# Patient Record
Sex: FEMALE | Race: WHITE | NOT HISPANIC OR LATINO | Employment: OTHER | URBAN - METROPOLITAN AREA
[De-identification: names, ages, dates, MRNs, and addresses within clinical notes are randomized per-mention and may not be internally consistent; named-entity substitution may affect disease eponyms.]

---

## 2022-09-14 ENCOUNTER — OFFICE VISIT (OUTPATIENT)
Dept: INTERNAL MEDICINE | Facility: CLINIC | Age: 72
End: 2022-09-14

## 2022-09-14 VITALS
TEMPERATURE: 98 F | BODY MASS INDEX: 38.98 KG/M2 | WEIGHT: 220 LBS | SYSTOLIC BLOOD PRESSURE: 128 MMHG | DIASTOLIC BLOOD PRESSURE: 80 MMHG | HEIGHT: 63 IN | OXYGEN SATURATION: 97 % | HEART RATE: 80 BPM

## 2022-09-14 DIAGNOSIS — F41.9 ANXIETY: ICD-10-CM

## 2022-09-14 DIAGNOSIS — E78.00 HYPERCHOLESTEROLEMIA: ICD-10-CM

## 2022-09-14 DIAGNOSIS — Z76.89 ENCOUNTER TO ESTABLISH CARE: Primary | ICD-10-CM

## 2022-09-14 DIAGNOSIS — I10 ESSENTIAL HYPERTENSION: ICD-10-CM

## 2022-09-14 DIAGNOSIS — E89.0 POSTOPERATIVE HYPOTHYROIDISM: ICD-10-CM

## 2022-09-14 DIAGNOSIS — F41.8 ANXIETY ABOUT HEALTH: ICD-10-CM

## 2022-09-14 DIAGNOSIS — E55.9 VITAMIN D DEFICIENCY: Chronic | ICD-10-CM

## 2022-09-14 DIAGNOSIS — I50.32 CHRONIC DIASTOLIC HEART FAILURE: ICD-10-CM

## 2022-09-14 DIAGNOSIS — F33.0 MILD EPISODE OF RECURRENT MAJOR DEPRESSIVE DISORDER: ICD-10-CM

## 2022-09-14 DIAGNOSIS — R10.84 GENERALIZED ABDOMINAL PAIN: ICD-10-CM

## 2022-09-14 PROBLEM — F33.9 RECURRENT MAJOR DEPRESSION: Chronic | Status: ACTIVE | Noted: 2019-05-13

## 2022-09-14 PROBLEM — M16.11 OSTEOARTHRITIS OF RIGHT HIP: Status: ACTIVE | Noted: 2022-05-06

## 2022-09-14 PROBLEM — G60.9 IDIOPATHIC PERIPHERAL NEUROPATHY: Status: ACTIVE | Noted: 2021-01-28

## 2022-09-14 PROBLEM — R27.8 SENSORY ATAXIA: Status: ACTIVE | Noted: 2021-01-28

## 2022-09-14 PROBLEM — M25.551 PAIN OF RIGHT HIP JOINT: Status: ACTIVE | Noted: 2022-05-06

## 2022-09-14 PROBLEM — F33.9 RECURRENT MAJOR DEPRESSION: Status: ACTIVE | Noted: 2019-05-13

## 2022-09-14 PROBLEM — K57.32 DIVERTICULITIS OF COLON: Status: ACTIVE | Noted: 2018-06-18

## 2022-09-14 PROBLEM — R29.6 RECURRENT FALLS: Status: ACTIVE | Noted: 2021-01-28

## 2022-09-14 PROBLEM — L82.1 SEBORRHEIC KERATOSIS: Status: ACTIVE | Noted: 2019-08-27

## 2022-09-14 PROCEDURE — 99204 OFFICE O/P NEW MOD 45 MIN: CPT | Performed by: NURSE PRACTITIONER

## 2022-09-14 RX ORDER — LOSARTAN POTASSIUM 25 MG/1
25 TABLET ORAL DAILY
COMMUNITY

## 2022-09-14 RX ORDER — DOCUSATE SODIUM 100 MG/1
100 CAPSULE, LIQUID FILLED ORAL 2 TIMES DAILY
COMMUNITY

## 2022-09-14 RX ORDER — ROSUVASTATIN CALCIUM 5 MG/1
TABLET, COATED ORAL
COMMUNITY

## 2022-09-14 RX ORDER — LEVOTHYROXINE SODIUM 137 UG/1
112 TABLET ORAL
COMMUNITY
Start: 2022-04-19 | End: 2022-09-22

## 2022-09-14 RX ORDER — ESCITALOPRAM OXALATE 5 MG/1
TABLET ORAL
COMMUNITY

## 2022-09-14 NOTE — PROGRESS NOTES
"        Chief Complaint  Abdominal Pain (Establish care new patient 10 weeks )     Subjective:      History of Present Illness {CC  Problem List  Visit  Diagnosis   Encounters  Notes  Medications  Labs  Result Review Imaging  Media :23}     Uzma Cancino presents to [facility] for:      She is here to establish care and is new to me.   Her brother is a patient here as well and is with her today.   She has moved her from [place], [place].     Prior PCP: Ines De La Paz: [facility]     She chronically has hypertension, hyperlipidemia, hypothyroid, anxiety/depression, diverticulosis with recurrent diverticulitis, diastolic dysfunction, vitamin d deficiency, degenerative scoliosis, prior appendectomy, fm hx CAD, idopathic peripheral neuropahty, migraines, dry eye syndrome, OA bl knees, facet arthropathy   \"medication phobia\"     Abd pain and bloating: lasting  10 weeks: review of her chart: abd pain/ recurrent diverticulitis almost yearly back to 2008.  IBS dx back in 2004.   Comes on after she eats: fat, fiber or sugar   She states she has stopped PPI (prevacid)   Can't take miralax: states \"burning in throat\"   Diverticulitis: June 23rd - treated with abx.  Augmentin   She had plan for colonoscopy and EGD in October however now she has moved to [place] and needs to establish with GI here.     States consuming 400-600 calories per day.   States she has lost 40 lbs.     Hypothyroid: 2003 developed nodules   (states had Hashimoto since her 30's)   States Dr Moody - changed dose to 137mcg daily   She wrote after visit and states current dose is 112 mcg (this was updated in her chart)   I reviewed note in Care Everywhere: she is to be taking one full tablet Monday to Saturday and 1/2 tablet on Sundays.   This was changed on 9/11/2022.     Requested referral with endocrine to establish.     Diastolic dysfunction: was being seen by cardiology, Dr Sevilla   Last echo: 66% 3/3/2021  Echo " 3/3/2021:   1. Left ventricular diastolic function is abnormal. Stage I: Impaired early left ventricular   relaxation (Abnormal relaxation pattern).    2. Mild concentric left ventricular hypertrophy.    3. The left atrium is normal in size and structure.    4. The right atrium is normal in size and structure.    5. Left ventricular ejection fraction, by Andino's Biplane, is normal at 66 %.   No SOA, CP.   She requests referral to Dr Diaz.       Hyperlipidemia: chronic - has been on lipitor in the past (felt like someone beat me with a bat).  Did not tolerate - changed to crestor and tolerating.    Did not need to take CoQ10      Dry eyes and dry mouth - states was tested for Sjogren.  But then stated did not get test and needs test.     In her Care Everywhere: seems she was tested.   8/31/2022: Anti-rho/ ss-a ab: negative     MALVIN: negative   Generally dx in 50-60s.  Would be rare to have dx after 65 yoa.       Vitamin D deficiency - states no longer taking vitamin d     Depression/anxiety: states she has been on lexapro for some time.  Agrees to referral to behavioral health.       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      Objective:      Physical Exam  Vitals reviewed.   Constitutional:       Appearance: Normal appearance. She is well-developed. She is obese.   HENT:      Head:      Comments: Wearing mask due to COVID   Neck:      Thyroid: No thyromegaly.   Cardiovascular:      Rate and Rhythm: Normal rate and regular rhythm.      Pulses: Normal pulses.      Heart sounds: Normal heart sounds.   Pulmonary:      Effort: Pulmonary effort is normal.      Breath sounds: Normal breath sounds.      Comments: E/U   Musculoskeletal:         General: Normal range of motion.      Cervical back: Normal range of motion and neck supple.   Lymphadenopathy:      Cervical:  "No cervical adenopathy.   Skin:     General: Skin is warm and dry.      Capillary Refill: Capillary refill takes 2 to 3 seconds.   Neurological:      Mental Status: She is alert and oriented to person, place, and time.   Psychiatric:         Mood and Affect: Mood is anxious.         Speech: Speech is rapid and pressured and tangential (her brother has to ask her to stay on one subject several times during the visit ).         Behavior: Behavior normal. Behavior is cooperative.         Thought Content: Thought content normal.        Result Review  Data Reviewed:{ Labs  Result Review  Imaging  Med Tab  Media :23}     The following data was reviewed by: Edison Brewer III, NP-C on 09/14/2022  Common labs    Common Labsle 8/12/22 8/27/22 9/14/22 9/14/22 9/14/22      1141 1141 1141   Glucose   105 (A)     BUN   7 (A)     Creatinine   0.75     Sodium   133 (A)     Potassium   3.8     Chloride   91 (A)     Calcium   9.2     Total Protein   6.2     Albumin   4.70     Total Bilirubin   0.7     Alkaline Phosphatase   111     AST (SGOT)   20     ALT (SGPT)   12     WBC 5.5 6.6   6.75   Hemoglobin 13.8 13.6   13.6   Hematocrit 41.1 40.5   40.6   Platelets 201 203   209   Total Cholesterol    163    Triglycerides    60    HDL Cholesterol    72 (A)    LDL Cholesterol     79    (A) Abnormal value       Comments are available for some flowsheets but are not being displayed.                  Vital Signs:   /80 (BP Location: Left arm, Patient Position: Sitting, Cuff Size: Adult)   Pulse 80   Temp 98 °F (36.7 °C) (Temporal)   Ht 160 cm (63\")   Wt 99.8 kg (220 lb)   SpO2 97%   BMI 38.97 kg/m²         Requested Prescriptions      No prescriptions requested or ordered in this encounter       Routine medications provided by this office will also be refilled via pharmacy request.       Current Outpatient Medications:   •  docusate sodium (Stool Softener) 100 MG capsule, Take 100 mg by mouth 2 (Two) Times a " Day., Disp: , Rfl:   •  escitalopram (LEXAPRO) 5 MG tablet, escitalopram 5 mg tablet  TAKE 1 TABLET BY MOUTH EVERY MORNING, Disp: , Rfl:   •  levothyroxine (SYNTHROID, LEVOTHROID) 137 MCG tablet, Take 112 mcg by mouth Every Morning., Disp: , Rfl:   •  losartan (COZAAR) 25 MG tablet, Take 25 mg by mouth Daily., Disp: , Rfl:   •  rosuvastatin (CRESTOR) 5 MG tablet, rosuvastatin 5 mg tablet  TAKE 1 TABLET BY MOUTH EVERY DAY, Disp: , Rfl:   •  fluticasone (FLONASE) 50 MCG/ACT nasal spray, 2 sprays into the nostril(s) as directed by provider Daily for 7 days., Disp: 15.8 mL, Rfl: 0  •  neomycin-colistin-hydrocortisone-thonzonium (CORTISPORIN TC) 3.3-3-10-0.5 MG/ML otic suspension, Administer 3 drops to the right ear 3 (Three) Times a Day for 7 days., Disp: 10 mL, Rfl: 0     Assessment and Plan:      Assessment and Plan {CC Problem List  Visit Diagnosis  ROS  Review (Popup)  Health Maintenance  Quality  BestPractice  Medications  SmartSets  SnapShot Encounters  Media :23}     Problem List Items Addressed This Visit        Cardiac and Vasculature    Chronic diastolic heart failure (HCC) (Chronic)    Relevant Orders    Ambulatory Referral to Cardiology    Essential hypertension (Chronic)    Current Assessment & Plan     Hypertension is improving with treatment.  Continue current treatment regimen.  Dietary sodium restriction.  Weight loss.  Blood pressure will be reassessed at the next regular appointment.         Relevant Medications    losartan (COZAAR) 25 MG tablet    Other Relevant Orders    Comprehensive Metabolic Panel (Completed)    CBC (No Diff) (Completed)    Hypercholesterolemia (Chronic)    Current Assessment & Plan     Continue statin          Relevant Medications    rosuvastatin (CRESTOR) 5 MG tablet    Other Relevant Orders    Lipid Panel With LDL / HDL Ratio (Completed)       Endocrine and Metabolic    Postoperative hypothyroidism (Chronic)    Relevant Medications    levothyroxine (SYNTHROID,  LEVOTHROID) 137 MCG tablet    Other Relevant Orders    TSH (Completed)    T4, free (Completed)    Ambulatory Referral to Endocrinology    Vitamin D deficiency (Chronic)       Mental Health    Recurrent major depression (HCC) (Chronic)    Relevant Medications    escitalopram (LEXAPRO) 5 MG tablet    Other Relevant Orders    Ambulatory Referral to Behavioral Health    Anxiety (Chronic)    Current Assessment & Plan     Will refer her to  for detail evaluation and medication management / adjustment          Relevant Orders    Ambulatory Referral to Behavioral Health    Anxiety about health (Chronic)      Other Visit Diagnoses     Encounter to establish care    -  Primary    Generalized abdominal pain        Relevant Orders    Ambulatory Referral to Gastroenterology        I spent 46 minutes caring for Uzma on this date of service. This time includes time spent by me in the following activities: preparing for the visit, reviewing tests, obtaining and/or reviewing a separately obtained history, performing a medically appropriate examination and/or evaluation, counseling and educating the patient/family/caregiver, ordering medications, tests, or procedures, referring and communicating with other health care professionals, documenting information in the medical record and independently interpreting results and communicating that information with the patient/family/caregiver    Follow Up {Instructions Charge Capture  Follow-up Communications :23}     Return in about 6 months (around 3/14/2023).      Patient was given instructions and counseling regarding her condition or for health maintenance advice. Please see specific information pulled into the AVS if appropriate.    Phamon disclaimer:   Much of this encounter note is an electronic transcription/translation of spoken language to printed text. The electronic translation of spoken language may permit erroneous, or at times, nonsensical words or phrases to be  inadvertently transcribed; Although I have reviewed the note for such errors, some may still exist.     Additional Patient Counseling:       There are no Patient Instructions on file for this visit.

## 2022-09-15 LAB
ALBUMIN SERPL-MCNC: 4.7 G/DL (ref 3.5–5.2)
ALBUMIN/GLOB SERPL: 3.1 G/DL
ALP SERPL-CCNC: 111 U/L (ref 39–117)
ALT SERPL-CCNC: 12 U/L (ref 1–33)
AST SERPL-CCNC: 20 U/L (ref 1–32)
BILIRUB SERPL-MCNC: 0.7 MG/DL (ref 0–1.2)
BUN SERPL-MCNC: 7 MG/DL (ref 8–23)
BUN/CREAT SERPL: 9.3 (ref 7–25)
CALCIUM SERPL-MCNC: 9.2 MG/DL (ref 8.6–10.5)
CHLORIDE SERPL-SCNC: 91 MMOL/L (ref 98–107)
CHOLEST SERPL-MCNC: 163 MG/DL (ref 0–200)
CO2 SERPL-SCNC: 26 MMOL/L (ref 22–29)
CREAT SERPL-MCNC: 0.75 MG/DL (ref 0.57–1)
EGFRCR-CYS SERPLBLD CKD-EPI 2021: 84.7 ML/MIN/1.73
ERYTHROCYTE [DISTWIDTH] IN BLOOD BY AUTOMATED COUNT: 14.9 % (ref 12.3–15.4)
GLOBULIN SER CALC-MCNC: 1.5 GM/DL
GLUCOSE SERPL-MCNC: 105 MG/DL (ref 65–99)
HCT VFR BLD AUTO: 40.6 % (ref 34–46.6)
HDLC SERPL-MCNC: 72 MG/DL (ref 40–60)
HGB BLD-MCNC: 13.6 G/DL (ref 12–15.9)
LDLC SERPL CALC-MCNC: 79 MG/DL (ref 0–100)
LDLC/HDLC SERPL: 1.1 {RATIO}
MCH RBC QN AUTO: 27.4 PG (ref 26.6–33)
MCHC RBC AUTO-ENTMCNC: 33.5 G/DL (ref 31.5–35.7)
MCV RBC AUTO: 81.7 FL (ref 79–97)
PLATELET # BLD AUTO: 209 10*3/MM3 (ref 140–450)
POTASSIUM SERPL-SCNC: 3.8 MMOL/L (ref 3.5–5.2)
PROT SERPL-MCNC: 6.2 G/DL (ref 6–8.5)
RBC # BLD AUTO: 4.97 10*6/MM3 (ref 3.77–5.28)
SODIUM SERPL-SCNC: 133 MMOL/L (ref 136–145)
T4 FREE SERPL-MCNC: 2.21 NG/DL (ref 0.93–1.7)
TRIGL SERPL-MCNC: 60 MG/DL (ref 0–150)
TSH SERPL DL<=0.005 MIU/L-ACNC: 5.57 UIU/ML (ref 0.27–4.2)
VLDLC SERPL CALC-MCNC: 12 MG/DL (ref 5–40)
WBC # BLD AUTO: 6.75 10*3/MM3 (ref 3.4–10.8)

## 2022-09-17 ENCOUNTER — HOSPITAL ENCOUNTER (EMERGENCY)
Facility: HOSPITAL | Age: 72
Discharge: HOME | End: 2022-09-17
Attending: EMERGENCY MEDICINE | Admitting: EMERGENCY MEDICINE

## 2022-09-17 VITALS
HEART RATE: 71 BPM | RESPIRATION RATE: 16 BRPM | DIASTOLIC BLOOD PRESSURE: 93 MMHG | TEMPERATURE: 98.7 F | SYSTOLIC BLOOD PRESSURE: 128 MMHG | OXYGEN SATURATION: 98 %

## 2022-09-17 DIAGNOSIS — H69.81 EUSTACHIAN TUBE DYSFUNCTION, RIGHT: ICD-10-CM

## 2022-09-17 DIAGNOSIS — H60.501 ACUTE OTITIS EXTERNA OF RIGHT EAR, UNSPECIFIED TYPE: Primary | ICD-10-CM

## 2022-09-17 DIAGNOSIS — H65.01 NON-RECURRENT ACUTE SEROUS OTITIS MEDIA OF RIGHT EAR: ICD-10-CM

## 2022-09-17 PROCEDURE — 25010000002 DEXAMETHASONE PER 1 MG: Performed by: EMERGENCY MEDICINE

## 2022-09-17 PROCEDURE — 99282 EMERGENCY DEPT VISIT SF MDM: CPT

## 2022-09-17 PROCEDURE — 96374 THER/PROPH/DIAG INJ IV PUSH: CPT

## 2022-09-17 PROCEDURE — 96361 HYDRATE IV INFUSION ADD-ON: CPT

## 2022-09-17 RX ORDER — FLUTICASONE PROPIONATE 50 MCG
2 SPRAY, SUSPENSION (ML) NASAL DAILY
Qty: 15.8 ML | Refills: 0 | Status: SHIPPED | OUTPATIENT
Start: 2022-09-17 | End: 2022-09-24

## 2022-09-17 RX ORDER — SODIUM CHLORIDE 0.9 % (FLUSH) 0.9 %
10 SYRINGE (ML) INJECTION AS NEEDED
Status: DISCONTINUED | OUTPATIENT
Start: 2022-09-17 | End: 2022-09-17 | Stop reason: HOSPADM

## 2022-09-17 RX ORDER — DEXAMETHASONE SODIUM PHOSPHATE 10 MG/ML
6 INJECTION INTRAMUSCULAR; INTRAVENOUS ONCE
Status: COMPLETED | OUTPATIENT
Start: 2022-09-17 | End: 2022-09-17

## 2022-09-17 RX ADMIN — SODIUM CHLORIDE 1000 ML: 9 INJECTION, SOLUTION INTRAVENOUS at 12:13

## 2022-09-17 RX ADMIN — DEXAMETHASONE SODIUM PHOSPHATE 6 MG: 10 INJECTION, SOLUTION INTRAMUSCULAR; INTRAVENOUS at 12:12

## 2022-09-17 NOTE — ED PROVIDER NOTES
EMERGENCY DEPARTMENT ENCOUNTER    Room Number:  32/32  Date seen:  9/17/2022  PCP: Edison Brewer III, KARLY-C  Historian: Patient      HPI:  Chief Complaint: Right ear pain  A complete HPI/ROS/PMH/PSH/SH/FH are unobtainable due to: Nothing  Context: Uzma Cancino is a 72 y.o. female who presents to the ED c/o right ear pain, dizziness, and nausea, onset Monday when she was flying here from [place].  She had stopped in [place] for a layover when symptoms began.  She is in the process of relocating here from [place].  She actually establish care with a PCP earlier this week.  She did not complain of ear pain to them but was more concerned with her GI symptoms that have been ongoing for 10 weeks now.  She reports that she has had some associated dizziness with this right ear pain as well as nausea, but the nausea has been more of an ongoing issue.  She denies any double vision.  She went to urgent care today and was told that her right eardrum was obscured by wax in her ear canal.  She now presents to the ER for further evaluation of symptoms.  She also is concerned that she may be dehydrated.  She is wanting expedited care for her ear symptoms as she is supposed to be flying back to [place] soon.  She actually had to cancel flight today due to her symptoms.            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   • Diverticulitis of colon 06/18/2018   • Essential hypertension 03/12/2009   • Hypercholesterolemia 03/24/2014   • Idiopathic peripheral neuropathy 01/28/2021   • Osteoarthritis of right hip 05/06/2022   • Pain of right hip joint 05/06/2022   • Recurrent falls 01/28/2021   • Primary osteoarthritis of both knees 03/24/2014   • Postoperative hypothyroidism 03/24/2014   • Recurrent major depression (HCC) 05/13/2019   • Rosacea 03/24/2014   • Seborrheic keratosis  08/27/2019   • 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      FAMILY HISTORY  Family History   Problem Relation Age of Onset   • Stroke Mother    • Cancer Mother    • Heart disease Mother    • Cancer Father          SOCIAL HISTORY  Social History     Socioeconomic History   • Marital status:    Tobacco Use   • Smoking status: Never Smoker   • Smokeless tobacco: Never Used   Substance and Sexual Activity   • Alcohol use: Never   • Drug use: Never         ALLERGIES  Promethazine, Promethazine hcl, Ciprofloxacin, Hydromorphone hcl, Meperidine, Phenylephrine, Succinylcholine, Sulfa antibiotics, and Valacyclovir hcl        REVIEW OF SYSTEMS  Review of Systems   Review of all 14 systems is negative other than stated in the HPI above.      PHYSICAL EXAM  ED Triage Vitals [09/17/22 1107]   Temp Heart Rate Resp BP SpO2   98.7 °F (37.1 °C) 67 18 -- 99 %      Temp src Heart Rate Source Patient Position BP Location FiO2 (%)   -- -- -- -- --         GENERAL: Awake and alert, no acute distress  HENT: nares patent, oropharynx clear, geographic tongue present, right external auditory canal with a thin layer of cerumen partially obscured the right TM.  This was mobilized using a cotton-tipped applicator in order to completely visualize the TM.  There is a serous effusion with some mild erythema of the EAC adjacent to the eardrum but without significant erythema of the TM itself.  Patient did have pain with insertion of the otoscope speculum.  EYES: no scleral icterus, EOMI  CV: regular rhythm, normal rate  RESPIRATORY: normal effort  ABDOMEN: soft, nondistended, nontender throughout  MUSCULOSKELETAL: no deformity  NEURO:  alert, moves all extremities, follows commands, cranial nerves II through XII grossly intact, speech fluent and clear, extraocular was intact without nystagmus  PSYCH:  calm, cooperative  SKIN: warm, dry    Vital signs and nursing notes reviewed.          LAB RESULTS  No results found for this or any previous visit (from the past 24 hour(s)).    Ordered the above labs and reviewed the results.        RADIOLOGY  No Radiology Exams Resulted Within Past 24 Hours    Ordered the above noted radiological studies. Reviewed by me in PACS.            PROCEDURES  Procedures              MEDICATIONS GIVEN IN ER  Medications   sodium chloride 0.9 % flush 10 mL (has no administration in time range)   sodium chloride 0.9 % bolus 1,000 mL (1,000 mL Intravenous New Bag 9/17/22 1213)   dexamethasone (DECADRON) injection 6 mg (6 mg Intravenous Given 9/17/22 1212)                   MEDICAL DECISION MAKING, PROGRESS, and CONSULTS    All labs have been independently reviewed by me.  All radiology studies have been reviewed by me and discussed with radiologist dictating the report.   EKG's independently viewed and interpreted by me.  Discussion below represents my analysis of pertinent findings related to patient's condition, differential diagnosis, treatment plan and final disposition.      Differential diagnosis:  Perforated TM  Serous effusion  Otitis externa  Otitis media  Eustachian tube dysfunction  Vestibular neuritis  BPPV       Patient presents for right ear pain with some associated dizziness and nausea.  Sounds like her nausea has been more of a chronic symptom.  She is not ataxic.  She does not have nystagmus on exam.  She does have evidence of serous otitis media, likely secondary to recent air travel and eustachian tube dysfunction.  There may be a mild otitis externa as well.  She was given a liter of IV fluids here as well as IV Decadron to help reduce swelling in the eustachian tubes.  She also be prescribed Flonase  nasal spray daily and Cortisporin HC eardrops.  There is not evidence of TM perforation at this time.  She was advised to follow-up with ENT.         I wore an N95 mask, face shield, and gloves during this patient encounter.  Patient also wearing a surgical mask.  Hand hygeine performed before and after seeing the patient.    DIAGNOSIS  Final diagnoses:   Acute otitis externa of right ear, unspecified type   Eustachian tube dysfunction, right         DISPOSITION  DISCHARGE    Patient discharged in stable condition.    Reviewed implications of results, diagnosis, meds, responsibility to follow up, warning signs and symptoms of possible worsening, potential complications and reasons to return to ER.    Patient/Family voiced understanding of above instructions.    Discussed plan for discharge, as there is no emergent indication for admission. Patient referred to primary care provider for BP management due to today's BP. Pt/family is agreeable and understands need for follow up and repeat testing.  Pt is aware that discharge does not mean that nothing is wrong but it indicates no emergency is present that requires admission and they must continue care with follow-up as given below or physician of their choice.     FOLLOW-UP  Edison Brewer III, NP-C  [address] [facility] [address]  [place] [id number] [phone]    Schedule an appointment as soon as possible for a visit       Rivera Cruz MD  [address] [facility]  [place] [address]  [phone]      As needed         Medication List      New Prescriptions    fluticasone 50 MCG/ACT nasal spray  Commonly known as: FLONASE  2 sprays into the nostril(s) as directed by provider Daily for 7 days.     neomycin-colistin-hydrocortisone-thonzonium 3.3-3-10-0.5 MG/ML otic suspension  Commonly known as: CORTISPORIN TC  Administer 3 drops to the right ear 3 (Three) Times a Day for 7 days.           Where to Get Your Medications      These medications were  sent to [id number] - [place], [place] - [address] AT [facility] - [phone] PH - [phone] FX  [address] [facility] [address]    Phone: [phone]   · fluticasone 50 MCG/ACT nasal spray  · neomycin-colistin-hydrocortisone-thonzonium 3.3-3-10-0.5 MG/ML otic suspension                   Latest Documented Vital Signs:  As of 12:56 EDT  BP- 148/87 HR- 55 Temp- 98.7 °F (37.1 °C) O2 sat- 98%        --    Please note that portions of this were completed with a voice recognition program.          Ramiro Soto MD  09/17/22 1474

## 2022-09-17 NOTE — ED TRIAGE NOTES
Patient to Er via car from home for right ear pain and nausea and dizziness since she got off a plane on Monday    Patient wearing mask this RN in PPE

## 2022-09-19 ENCOUNTER — APPOINTMENT (OUTPATIENT)
Dept: CT IMAGING | Facility: HOSPITAL | Age: 72
End: 2022-09-19

## 2022-09-19 ENCOUNTER — APPOINTMENT (OUTPATIENT)
Dept: GENERAL RADIOLOGY | Facility: HOSPITAL | Age: 72
End: 2022-09-19

## 2022-09-19 ENCOUNTER — HOSPITAL ENCOUNTER (EMERGENCY)
Facility: HOSPITAL | Age: 72
Discharge: HOME | End: 2022-09-19
Attending: EMERGENCY MEDICINE | Admitting: EMERGENCY MEDICINE

## 2022-09-19 VITALS
WEIGHT: 215 LBS | HEIGHT: 63 IN | HEART RATE: 60 BPM | TEMPERATURE: 97 F | RESPIRATION RATE: 18 BRPM | OXYGEN SATURATION: 96 % | BODY MASS INDEX: 38.09 KG/M2 | DIASTOLIC BLOOD PRESSURE: 88 MMHG | SYSTOLIC BLOOD PRESSURE: 130 MMHG
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
## 2022-09-19 DIAGNOSIS — M51.35 DDD (DEGENERATIVE DISC DISEASE), THORACOLUMBAR: ICD-10-CM
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
## 2022-09-19 DIAGNOSIS — M50.30 DDD (DEGENERATIVE DISC DISEASE), CERVICAL: ICD-10-CM

## 2022-09-19 DIAGNOSIS — M16.11 ARTHRITIS OF RIGHT HIP: ICD-10-CM

## 2022-09-19 PROBLEM — F41.8 ANXIETY ABOUT HEALTH: Chronic | Status: ACTIVE | Noted: 2022-09-19

## 2022-09-19 PROBLEM — I50.32 CHRONIC DIASTOLIC HEART FAILURE: Chronic | Status: ACTIVE | Noted: 2021-02-26

## 2022-09-19 PROBLEM — F41.8 ANXIETY ABOUT HEALTH: Status: ACTIVE | Noted: 2022-09-19

## 2022-09-19 LAB — QT INTERVAL: 478 MS

## 2022-09-19 PROCEDURE — 72072 X-RAY EXAM THORAC SPINE 3VWS: CPT

## 2022-09-19 PROCEDURE — 70450 CT HEAD/BRAIN W/O DYE: CPT

## 2022-09-19 PROCEDURE — 73502 X-RAY EXAM HIP UNI 2-3 VIEWS: CPT

## 2022-09-19 PROCEDURE — 72110 X-RAY EXAM L-2 SPINE 4/>VWS: CPT

## 2022-09-19 PROCEDURE — 93010 ELECTROCARDIOGRAM REPORT: CPT | Performed by: INTERNAL MEDICINE

## 2022-09-19 PROCEDURE — 99283 EMERGENCY DEPT VISIT LOW MDM: CPT

## 2022-09-19 PROCEDURE — 93005 ELECTROCARDIOGRAM TRACING: CPT | Performed by: EMERGENCY MEDICINE

## 2022-09-19 PROCEDURE — 72128 CT CHEST SPINE W/O DYE: CPT

## 2022-09-19 PROCEDURE — 72125 CT NECK SPINE W/O DYE: CPT

## 2022-09-19 NOTE — ED PROVIDER NOTES
EMERGENCY DEPARTMENT ENCOUNTER    Room Number:  30/30  Date of encounter:  9/19/2022  PCP: Edison Brewer III, NP-C  Historian: Patient      HPI:  Chief Complaint: Fall, back pain    A complete HPI/ROS/PMH/PSH/SH/FH are unobtainable due to: N/A    Context: Uzma Cancino is a 72 y.o. female who presents to the ED c/o low back pain, upper back pain and right hip pain after falling at her brother's house today.  She is visiting from out of town.  She states that she tripped over a rug and fell, landing mostly on her left side.  She has sharp pain in the left lower back, right hip and upper thoracic spine.  She did not lose consciousness.    She has lots of other issues that are not related to her fall-she has been having abdominal pain and bloating for the past several months.  She is scheduled to see gastroenterology tomorrow.  Review of her old records in epic show that she has been seeing her primary care physician for multiple complaints, falls, chronic constipation, GERD, decreased appetite.  She has a history of anxiety as well but is reluctant to take medications.  She has left ear pain and dizziness, she has chest pain and palpitations-appears to these issues have been addressed with recent visits to the emergency room and her primary care provider.      The patient was placed in a mask in triage, hand hygiene was performed before and after my interaction with the patient.  I wore a mask, safety glasses and gloves during my entire interaction with the patient.    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   • Diverticulitis of colon 06/18/2018   • Essential hypertension 03/12/2009   • Hypercholesterolemia 03/24/2014   • Idiopathic peripheral neuropathy 01/28/2021   • Osteoarthritis of right hip 05/06/2022   • Pain of right hip joint 05/06/2022   • Recurrent falls  01/28/2021   • Primary osteoarthritis of both knees 03/24/2014   • Postoperative hypothyroidism 03/24/2014   • Recurrent major depression (HCC) 05/13/2019   • Rosacea 03/24/2014   • Seborrheic keratosis 08/27/2019   • 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      FAMILY HISTORY  Family History   Problem Relation Age of Onset   • Stroke Mother    • Cancer Mother    • Heart disease Mother    • Cancer Father          SOCIAL HISTORY  Social History     Socioeconomic History   • Marital status:    Tobacco Use   • Smoking status: Never Smoker   • Smokeless tobacco: Never Used   Substance and Sexual Activity   • Alcohol use: Never   • Drug use: Never         ALLERGIES  Promethazine, Promethazine hcl, Ciprofloxacin, Hydromorphone hcl, Meperidine, Phenylephrine, Succinylcholine, Sulfa antibiotics, and Valacyclovir hcl        REVIEW OF SYSTEMS  Review of Systems   Constitutional: Negative for fever.   Musculoskeletal: Positive for arthralgias (Right hip pain) and back pain.        All systems reviewed and negative except for those discussed in HPI.       PHYSICAL EXAM    I have reviewed the triage vital signs and nursing notes.    ED Triage Vitals [09/19/22 1338]   Temp Heart Rate Resp BP SpO2   97 °F (36.1 °C) 82 18 125/81 97 %      Temp src Heart Rate Source Patient Position BP Location FiO2 (%)   -- Monitor -- -- --       Physical Exam   Constitutional: Pt. is awake and alert.  She appears oriented.  She is in no distress.   HENT: Normocephalic and atraumatic.   Neck: Normal range of motion. Neck supple. No JVD present.   Cardiovascular: Normal rate, regular rhythm and normal  heart sounds. No murmur heard.  Pulmonary/Chest: Effort normal and breath sounds normal. No stridor. No respiratory distress. No wheezes, no rales.   Abdominal: Soft. Bowel sounds are normal. No distension. There is no tenderness. There is no rebound and no guarding.   Musculoskeletal: No deformities of the extremities are noted.  She ambulates with ease with a cane.  She has moderate left lower lumbar spine tenderness but no deformities.  She also has tenderness in the upper/mid thoracic spine without step-off or deformity.    Neurological: Pt. is alert and oriented to person, place, and time.  She has no focal neurologic deficits  Skin: Skin is warm and dry. No rash noted. Pt. is not diaphoretic. No erythema.   Psychiatric: Mood is normal, she is tangential at times but easily redirected.    Nursing note and vitals reviewed.        LAB RESULTS  Recent Results (from the past 24 hour(s))   ECG 12 Lead    Collection Time: 09/19/22  4:44 PM   Result Value Ref Range    QT Interval 478 ms       Ordered the above labs and independently reviewed the results.        RADIOLOGY  CT Head Without Contrast    Result Date: 9/19/2022  CT HEAD WITHOUT CONTRAST  HISTORY: Fall, headache.  COMPARISON: None.  FINDINGS: The brain and ventricles are symmetrical. There is no evidence of hemorrhage, hydrocephalus or of abnormal extra-axial fluid. No focal area of decreased attenuation to suggest acute infarction or contusion is appreciated. A partially empty sella is noted which is of doubtful clinical significance. Moderate vascular calcification involving the carotid siphons are noted bilaterally.      No evidence of fracture or of intracranial hemorrhage. Further evaluation could be performed with MRI examination of the brain as indicated. See above.    Radiation dose reduction techniques were utilized, including automated exposure control and exposure modulation based on body size.       CT Cervical Spine Without Contrast, CT Thoracic  Spine Without Contrast    Result Date: 9/19/2022  CT CERVICAL SPINE AND THORACIC SPINE WITHOUT CONTRAST  HISTORY: Fall, neck pain, back pain.  COMPARISON: None.  CT EXAMINATION OF THE CERVICAL SPINE WITHOUT CONTRAST:  There is reversal of the normal cervical lordosis with the apex at level of C3. Moderate-to-severe loss of disc height is noted at C4-5, C5-6 and C6-7. There is a grade 1 anterolisthesis of C7 upon T1 estimated to be 2 mm.  Severe degenerative disease is noted at C1-C2 laterally to the right. There is moderate foraminal stenosis to the right at C1-C2 secondary to extension of a disc osteophyte complex at the neural foramen.  C2-3: There is no evidence of disc bulge or herniation.  C3-4: Mild foraminal stenosis is present on the right secondary to uncovertebral degenerative disease.  C4-5: A broad-based disc osteophyte complex is present which is more prominent to the left. There is mild flattening of the ventral surface of thecal sac and cord. Mild foraminal stenosis is present bilaterally secondary to extension of a small disc osteophyte complex into the neural foramen.  At C5-6: A mild central disc osteophyte complex is present with no evidence of herniation. Mild foraminal stenosis is present on the left secondary to loss of disc height and extension of a disc osteophyte complex into the neural foramen.  C6-7: A mild central disc osteophyte complex is present with no evidence of herniation. Mild foraminal stenosis is present bilaterally secondary to extension of a disc osteophyte complex into the neural foramen.  C7-T1: There is no evidence of disc bulge or herniation.      Multilevel degenerative disease involving the cervical spine as described with no evidence of fracture. See above.   CT EXAMINATION OF THE THORACIC SPINE WITHOUT CONTRAST:  Areas of decreased attenuation with coarsened trabecula are appreciated involving the anterior aspect of T5, the majority of the T7 and T8 vertebral bodies  and the lateral aspect of the T11 vertebral body to the right consistent with multiple trauma is. There is severe loss of disc height with moderate endplate degenerative changes at T11/T12. There is no evidence of fracture. Vacuum disc effect is noted from T9 to L3. Evaluation of the intraspinal contents was limited by technique but there was no evidence of disc herniation. Moderate calcified ligamentum flavum hypertrophy is appreciated at T10/T11, more prominent to the left.  IMPRESSION: Multilevel degenerative disease involving the osseous spine and multiple hemangiomas as described. There is no evidence of fracture. Further evaluation could be performed with a MRI examination of the thoracic spine as indicated.    Radiation dose reduction techniques were utilized, including automated exposure control and exposure modulation based on body size.       XR Spine Lumbar Complete 4+VW, XR Spine Thoracic 3 View, XR Hip With or Without Pelvis 2 - 3 View Right    Result Date: 9/19/2022  FRONTAL PROJECTION OF THE PELVIS AND TWO RADIOGRAPHIC VIEWS OF THE RIGHT HIP, THREE RADIOGRAPHIC VIEWS OF THE THORACIC SPINE, AND LUMBAR SPINE PLAIN FILM SERIES  CLINICAL HISTORY: Patient fell with pelvic and right hip pain, midback pain, and low back pain.  PELVIS AND RIGHT HIP: Frontal projection of the pelvis and 2 radiographic views of the right hip demonstrate marked osteoarthritic changes involving the right hip without convincing evidence to suggest acute fracture or bony malalignment. There is an area of apparent increased density across the right femoral neck which is felt to represent an overlying collar of osteophytosis. If the patient's symptoms persist, further evaluation could be performed with an MRI or CT scan of the right hip, as clinically indicated. Incidental note is also made of prominent arthritic changes within the symphysis pubis.  THORACIC SPINE: Three radiographic views of the thoracic spine demonstrate some  widening of the anterior disc space at the T9-10 level. Additionally, there is a question of an acute angulated anterior cortex where an acute fracture could not be excluded. I recommend further evaluation of the thoracic spine with CT or MR imaging. Incidental note is made of multilevel degenerative phenomena within the thoracic spine.  LUMBAR SPINE: AP, lateral, and bilateral oblique projections of the lumbar spine demonstrate moderate dextroconvex scoliotic curvature of the lumbar spine with its apex centered at L3. There is maintenance of vertebral body height. There is no convincing evidence to suggest an acute fracture. Multilevel prominent degenerative disc changes are seen within the lumbar spine. Prominent facet arthritic changes are identified within the lower lumbar spine.  The findings and recommendations of this report were discussed with Dr. Guzmán on 09/19/2022 at approximately 4:08 PM.        I ordered the above noted radiological studies. Reviewed by me and discussed with radiologist.  See dictation for official radiology interpretation.      PROCEDURES    Procedures      MEDICATIONS GIVEN IN ER    Medications - No data to display      PROGRESS, DATA ANALYSIS, CONSULTS, AND MEDICAL DECISION MAKING    Any/all labs have been independently reviewed by me.  Any/all radiology studies have been reviewed by me and discussed with radiologist dictating the report.   EKG's independently viewed and interpreted by me.  Discussion below represents my analysis of pertinent findings related to patient's condition, differential diagnosis, treatment plan and final disposition.    Number of Diagnoses or Management Options     Amount and/or Complexity of Data Reviewed  Clinical lab tests:   Tests in the radiology section of CPT®: Yes  Tests in the medicine section of CPT®:  No  Review and summarize past medical records:  (Yes - see HPI)  Independent visualization of images, tracings, or specimens: (Yes - see  "below)      ED Course as of 09/19/22 1801   Mon Sep 19, 2022   1623 Plain films of the right hip, thoracic and lumbar spine were definitely visualized by me and discussed with Dr. Powell (radiology)-CT of the thoracic spine is recommended due to incomplete visualization of the thoracic spine.  Lumbar spine and hips did not show any acute processes.    On reevaluation, the patient does have minimal fluid behind her TMs bilaterally.  She is now reporting increased head and cervical spine pain.  She has multiple other concerns that are not related to her fall - ongoing abd pain, chest pain.  She is worried that the few doses of Flonase and the eardrops that she  has taken over the last few days may have \"gotten into her stomach\" and caused her to have increased gastric upset.  I explained to her that this was very unlikely.  Ordered additional imaging and EKG. [WC]   1648 EKG performed at 1644 and interpreted by me shows normal sinus rhythm with a heart 55 bpm.  Parables are normal.  QRS complexes and ST-T segments are unremarkable.  This does not appear to be any different from cardiologist interpretation of prior EKGs from 2018 and 2021 which were reviewed in [employer]. [WC]      ED Course User Index  [WC] Twan Guzmán MD       AS OF 18:01 EDT VITALS:    BP - 119/83  HR - 69  TEMP - 97 °F (36.1 °C)  02 SATS - 98%        DIAGNOSIS  Final diagnoses:   Fall in home, initial encounter   Arthritis of right hip   DDD (degenerative disc disease), cervical   DDD (degenerative disc disease), thoracolumbar         DISPOSITION  Discharged          Note Disclaimer: At [facility], we believe that sharing information builds trust and better relationships. You are receiving this note because you recently visited [facility]. It is possible you will see health information before a provider has talked with you about it. This kind of information can be easy to misunderstand. To help you fully understand what it means for your " health, we urge you to discuss this note with your provider.\         Twan Guzmán MD  09/19/22 2397

## 2022-09-19 NOTE — ED TRIAGE NOTES
Pt was brought in by ems from home for fall. Pt tripped on rug and fell. Pt c/o head, generalized back and right arm pain. Pt denies hitting head. Pt denies blood thinners    This RN wore mask and goggles during time of contact

## 2022-09-20 ENCOUNTER — PREP FOR SURGERY (OUTPATIENT)
Dept: SURGERY | Facility: SURGERY CENTER | Age: 72
End: 2022-09-20

## 2022-09-20 ENCOUNTER — OFFICE VISIT (OUTPATIENT)
Dept: GASTROENTEROLOGY | Facility: CLINIC | Age: 72
End: 2022-09-20

## 2022-09-20 VITALS
OXYGEN SATURATION: 97 % | WEIGHT: 219 LBS | TEMPERATURE: 97.8 F | DIASTOLIC BLOOD PRESSURE: 64 MMHG | HEIGHT: 63 IN | SYSTOLIC BLOOD PRESSURE: 120 MMHG | BODY MASS INDEX: 38.8 KG/M2 | HEART RATE: 85 BPM

## 2022-09-20 DIAGNOSIS — K57.90 DIVERTICULOSIS: ICD-10-CM

## 2022-09-20 DIAGNOSIS — R14.0 BLOATING: ICD-10-CM

## 2022-09-20 DIAGNOSIS — K57.32 DIVERTICULITIS OF COLON: Primary | ICD-10-CM

## 2022-09-20 DIAGNOSIS — R10.32 LEFT LOWER QUADRANT ABDOMINAL PAIN: ICD-10-CM

## 2022-09-20 DIAGNOSIS — Q43.3 CONGENITAL MALROTATION OF INTESTINE: Primary | ICD-10-CM

## 2022-09-20 DIAGNOSIS — K21.9 GASTROESOPHAGEAL REFLUX DISEASE, UNSPECIFIED WHETHER ESOPHAGITIS PRESENT: ICD-10-CM

## 2022-09-20 DIAGNOSIS — K57.32 DIVERTICULITIS OF COLON: ICD-10-CM

## 2022-09-20 PROCEDURE — 99204 OFFICE O/P NEW MOD 45 MIN: CPT | Performed by: INTERNAL MEDICINE

## 2022-09-20 RX ORDER — LEVOTHYROXINE SODIUM 112 UG/1
112 TABLET ORAL
COMMUNITY
Start: 2022-07-14 | End: 2023-09-12

## 2022-09-20 RX ORDER — SODIUM CHLORIDE 0.9 % (FLUSH) 0.9 %
10 SYRINGE (ML) INJECTION AS NEEDED
Status: CANCELLED | OUTPATIENT
Start: 2022-09-20

## 2022-09-20 RX ORDER — SODIUM CHLORIDE, SODIUM LACTATE, POTASSIUM CHLORIDE, CALCIUM CHLORIDE 600; 310; 30; 20 MG/100ML; MG/100ML; MG/100ML; MG/100ML
30 INJECTION, SOLUTION INTRAVENOUS CONTINUOUS PRN
Status: CANCELLED | OUTPATIENT
Start: 2022-09-20

## 2022-09-20 RX ORDER — SODIUM CHLORIDE 0.9 % (FLUSH) 0.9 %
3 SYRINGE (ML) INJECTION EVERY 12 HOURS SCHEDULED
Status: CANCELLED | OUTPATIENT
Start: 2022-09-20

## 2022-09-20 NOTE — PROGRESS NOTES
"Chief Complaint   Patient presents with   • Abdominal Pain   • Diverticulitis         History of Present Illness  72-year old female presents to the office today for evaluation. She lives in [place] full time. Her brother has accompanied her on her office visit today.     Recent history of diverticulitis treated with Augmentin. She had a total of 2 CT scans, with the second showing resolution of the diverticulitis. Significant diverticulosis per her report. She has a significant history of constipation and has also had very greasy stools in the past. She takes colace 2 tabs daily. She has had to perform digital disimpaction.     She reports getting sick in June. She reports significant abdominal bloating and had to reduce her caloric intake. She reverted to more of a full liquid diet. She was previously treated in [place] by a gastroenterologist. She will have significant abdominal pain and cramping after eating in conjunction with abdominal bloating. Her last scopes she feels were done about 5-7 years ago. She has tried H2 blockers in the past but states that they caused abdominal pain. Pain is located in her left mid abdomen area. She reports a total weight loss of 40 lbs over the past year. She reports having a malrotated cecum. She reports trouble taking MiraLax.     She is not currently taking any medication for her GI symptoms.     CT ABDOMEN PELVIS W CONTRAST (07/02/2022 00:36)  Comprehensive Metabolic Panel (09/14/2022 11:41)    Review of Systems   Gastrointestinal: Positive for abdominal distention, abdominal pain and constipation.        Result Review :           Vital Signs:   /64   Pulse 85   Temp 97.8 °F (36.6 °C)   Ht 160 cm (63\")   Wt 99.3 kg (219 lb)   SpO2 97%   BMI 38.79 kg/m²     Body mass index is 38.79 kg/m².     Physical Exam  Vitals reviewed.   Constitutional:       Appearance: Normal appearance.   HENT:      Nose: No nasal deformity.   Eyes:      General: No scleral " icterus.  Neck:      Comments: Trachea midline.  Cardiovascular:      Rate and Rhythm: Normal rate and regular rhythm.   Pulmonary:      Effort: No respiratory distress.      Breath sounds: Normal breath sounds.   Abdominal:      General: Bowel sounds are normal. There is no distension.      Palpations: Abdomen is soft. There is no mass.      Tenderness: There is no abdominal tenderness.   Lymphadenopathy:      Comments: No periumbilical lymphadenopathy.   Skin:     General: Skin is warm.   Neurological:      Mental Status: She is alert.           Assessment and Plan    Diagnoses and all orders for this visit:    1. Congenital malrotation of intestine (Primary)    2. Diverticulitis of colon    3. Gastroesophageal reflux disease, unspecified whether esophagitis present    4. Left lower quadrant abdominal pain    5. Diverticulosis    6. Bloating       Documentation by Dinorah FRANCIS acting as a scribe in the following sections (HPI, Assessment, Plan) for the undersigned provider.       I have reviewed and confirmed the accuracy of the HPI and Assessment and Plan as documented by the PENNY Eugene        Follow Up   No follow-ups on file.    Patient Instructions   1. We will first proceed with an EGD and colonoscopy for further evaluation of symptoms. Additional recommendations pending outcome of procedures.     2.  For diverticulosis, we recommend maintaining a high fiber diet.     3. For abdominal discomfort we recommend IBGard. This is available OTC at your local grocery or pharmacy and works well to help manage your lower GI symptoms. Follow package instructions for use.     4. Continue colace nightly.

## 2022-09-20 NOTE — PATIENT INSTRUCTIONS
We will first proceed with an EGD and colonoscopy for further evaluation of symptoms. Additional recommendations pending outcome of procedures.     2.  For diverticulosis, we recommend maintaining a high fiber diet.     3. For abdominal discomfort we recommend IBGard. This is available OTC at your local grocery or pharmacy and works well to help manage your lower GI symptoms. Follow package instructions for use.     4. Continue colace nightly.

## 2022-09-21 ENCOUNTER — TELEPHONE (OUTPATIENT)
Dept: INTERNAL MEDICINE | Facility: CLINIC | Age: 72
End: 2022-09-21

## 2022-09-21 PROBLEM — K57.90 DIVERTICULOSIS: Status: ACTIVE | Noted: 2022-09-21

## 2022-09-21 PROBLEM — R14.0 BLOATING: Status: ACTIVE | Noted: 2022-09-21

## 2022-09-21 PROBLEM — R10.32 LEFT LOWER QUADRANT ABDOMINAL PAIN: Status: ACTIVE | Noted: 2022-09-21

## 2022-09-21 PROBLEM — K21.9 GASTROESOPHAGEAL REFLUX DISEASE: Status: ACTIVE | Noted: 2022-09-21

## 2022-09-22 ENCOUNTER — OFFICE VISIT (OUTPATIENT)
Dept: INTERNAL MEDICINE | Facility: CLINIC | Age: 72
End: 2022-09-22

## 2022-09-22 VITALS
DIASTOLIC BLOOD PRESSURE: 83 MMHG | OXYGEN SATURATION: 97 % | HEART RATE: 79 BPM | HEIGHT: 63 IN | WEIGHT: 217 LBS | BODY MASS INDEX: 38.45 KG/M2 | SYSTOLIC BLOOD PRESSURE: 120 MMHG | TEMPERATURE: 96.4 F

## 2022-09-22 DIAGNOSIS — R29.6 RECURRENT FALLS: ICD-10-CM

## 2022-09-22 DIAGNOSIS — H69.81 DYSFUNCTION OF RIGHT EUSTACHIAN TUBE: ICD-10-CM

## 2022-09-22 DIAGNOSIS — E89.0 POSTOPERATIVE HYPOTHYROIDISM: Primary | Chronic | ICD-10-CM

## 2022-09-22 PROBLEM — M86.9 OSTEITIS PUBIS: Status: ACTIVE | Noted: 2019-05-13

## 2022-09-22 PROBLEM — M54.50 CHRONIC LOW BACK PAIN: Status: ACTIVE | Noted: 2017-05-15

## 2022-09-22 PROBLEM — Z79.899 ENCOUNTER FOR LONG-TERM (CURRENT) USE OF MEDICATIONS: Status: ACTIVE | Noted: 2021-02-12

## 2022-09-22 PROBLEM — M47.817 LUMBOSACRAL SPONDYLOSIS: Status: ACTIVE | Noted: 2017-05-15

## 2022-09-22 PROBLEM — G43.809 MIGRAINE VARIANT: Status: ACTIVE | Noted: 2021-01-28

## 2022-09-22 PROBLEM — G89.29 CHRONIC LOW BACK PAIN: Status: ACTIVE | Noted: 2017-05-15

## 2022-09-22 PROCEDURE — 99214 OFFICE O/P EST MOD 30 MIN: CPT | Performed by: NURSE PRACTITIONER

## 2022-09-22 NOTE — ASSESSMENT & PLAN NOTE
Urgent referral to ENT.   Continue with ear drops-- cortisporin.   May benefit from antihistamine.

## 2022-09-22 NOTE — PROGRESS NOTES
"Chief Complaint  Hospital Follow Up Visit    Subjective        Uzma Cancino presents to [facility]  History of Present Illness  This is a 71 y/o female presenting to office for ER follow up from ER visits on 9/17/22 and 9/19/22. Patient had presented on 9/17/22 where patient had experienced some right ear pain, dizziness, and nausea. Patient reports that she had gone to  where they tried to get wax out of it but it caused severe pain. Patient in the ER had the wax removed and was seen to have serous otitis media. Patient was given IV decadron and prescribed cortisporin HC ear drops and flonase to help with this. Patient was recommended to f/u with ENT. Patient then had a fall on 9/19/22 where she had tripped over a rug. Patient had report landing on left side. Patient had imaging of head, full spine-- no acute fracture noted. Patient recommended to use OTC pain relievers to help with pain.     Patient reports hx of thyroid disorder; patient reports thyroidectomy in 2003; had previous nodular thyroids. Patient reports she has been on synthroid since this procedure; Patient reports she has been following with endocrinology back in [place]. Recent referral placed for endocrinology. Patient reports taking 112mcg 10 weeks ago.     Objective   Vital Signs:  /83 (BP Location: Left arm, Patient Position: Sitting, Cuff Size: Adult)   Pulse 79   Temp 96.4 °F (35.8 °C) (Temporal)   Ht 160 cm (63\")   Wt 98.4 kg (217 lb)   SpO2 97%   BMI 38.44 kg/m²   Estimated body mass index is 38.44 kg/m² as calculated from the following:    Height as of this encounter: 160 cm (63\").    Weight as of this encounter: 98.4 kg (217 lb).          Physical Exam  Constitutional:       Appearance: Normal appearance. She is obese.   HENT:      Head: Normocephalic and atraumatic.      Right Ear: Tenderness present. A middle ear effusion is present. Tympanic membrane is injected and erythematous.      Left " Ear: A middle ear effusion is present.      Nose: Nose normal.   Eyes:      Pupils: Pupils are equal, round, and reactive to light.      Comments: +glasses   Cardiovascular:      Rate and Rhythm: Normal rate and regular rhythm.      Pulses: Normal pulses.      Heart sounds: Normal heart sounds. No murmur heard.    No friction rub. No gallop.   Pulmonary:      Effort: Pulmonary effort is normal. No respiratory distress.      Breath sounds: Normal breath sounds. No stridor. No wheezing, rhonchi or rales.   Musculoskeletal:      Cervical back: Normal range of motion and neck supple.   Skin:     General: Skin is warm and dry.   Neurological:      General: No focal deficit present.      Mental Status: She is alert and oriented to person, place, and time. Mental status is at baseline.      Motor: Weakness present.      Comments: Using cane   Psychiatric:         Mood and Affect: Mood normal.         Thought Content: Thought content normal.         Judgment: Judgment normal.        Result Review :  The following data was reviewed by: PENNY Mahajan on 09/22/2022:  Common labs    Common Labs 8/12/22 8/27/22 9/14/22 9/14/22 9/14/22      1141 1141 1141   Glucose   105 (A)     BUN   7 (A)     Creatinine   0.75     Sodium   133 (A)     Potassium   3.8     Chloride   91 (A)     Calcium   9.2     Total Protein   6.2     Albumin   4.70     Total Bilirubin   0.7     Alkaline Phosphatase   111     AST (SGOT)   20     ALT (SGPT)   12     WBC 5.5 6.6   6.75   Hemoglobin 13.8 13.6   13.6   Hematocrit 41.1 40.5   40.6   Platelets 201 203   209   Total Cholesterol    163    Triglycerides    60    HDL Cholesterol    72 (A)    LDL Cholesterol     79    (A) Abnormal value       Comments are available for some flowsheets but are not being displayed.                     Assessment and Plan   Diagnoses and all orders for this visit:    1. Postoperative hypothyroidism (Primary)  Assessment & Plan:  Patient to continue on synthroid.   F/u  with [facility] endocrinology on 9/29/22.       Orders:  -     Cancel: Ambulatory Referral to Endocrinology    2. Dysfunction of right eustachian tube  Assessment & Plan:  Urgent referral to ENT.   Continue with ear drops-- cortisporin.   May benefit from antihistamine.     Orders:  -     Ambulatory Referral to ENT (Otolaryngology)    3. Recurrent falls         I spent 30 minutes caring for Uzma on this date of service. This time includes time spent by me in the following activities:preparing for the visit, reviewing tests, obtaining and/or reviewing a separately obtained history, performing a medically appropriate examination and/or evaluation , counseling and educating the patient/family/caregiver, ordering medications, tests, or procedures, documenting information in the medical record and care coordination  Follow Up   Return for Next scheduled follow up 3/15/22 with Manohar Brewer.  Patient was given instructions and counseling regarding her condition or for health maintenance advice. Please see specific information pulled into the AVS if appropriate.

## 2022-09-22 NOTE — ASSESSMENT & PLAN NOTE
Patient to continue on synthroid.   F/u with [facility] endocrinology on 9/29/22.

## 2022-10-03 ENCOUNTER — TELEPHONE (OUTPATIENT)
Dept: INTERNAL MEDICINE | Facility: CLINIC | Age: 72
End: 2022-10-03

## 2022-10-03 NOTE — TELEPHONE ENCOUNTER
Patient has called in regards to referral placed Endocrinologist, she stated she seen Marielena was supposed to get one to Endo, I informed patient Marielena placed one with ENT and Manohar had placed one for Endo. She was to get in sooner  Number given

## 2022-11-16 ENCOUNTER — TELEPHONE (OUTPATIENT)
Dept: GASTROENTEROLOGY | Facility: CLINIC | Age: 72
End: 2022-11-16

## 2023-01-04 ENCOUNTER — APPOINTMENT (OUTPATIENT)
Dept: SLEEP MEDICINE | Facility: HOSPITAL | Age: 73
End: 2023-01-04
Payer: MEDICARE

## 2023-01-09 DIAGNOSIS — H91.90 HEARING LOSS, UNSPECIFIED HEARING LOSS TYPE, UNSPECIFIED LATERALITY: Primary | ICD-10-CM

## 2023-01-11 ENCOUNTER — OFFICE VISIT (OUTPATIENT)
Dept: SLEEP MEDICINE | Age: 73
End: 2023-01-11
Payer: COMMERCIAL

## 2023-01-11 VITALS
HEIGHT: 64 IN | DIASTOLIC BLOOD PRESSURE: 60 MMHG | RESPIRATION RATE: 19 BRPM | OXYGEN SATURATION: 94 % | TEMPERATURE: 97.7 F | SYSTOLIC BLOOD PRESSURE: 90 MMHG | BODY MASS INDEX: 34.38 KG/M2 | WEIGHT: 201.4 LBS | HEART RATE: 90 BPM

## 2023-01-11 DIAGNOSIS — F51.04 PSYCHOPHYSIOLOGIC INSOMNIA: ICD-10-CM

## 2023-01-11 DIAGNOSIS — F51.04 CHRONIC INSOMNIA: Primary | ICD-10-CM

## 2023-01-11 DIAGNOSIS — G47.33 OSA (OBSTRUCTIVE SLEEP APNEA): ICD-10-CM

## 2023-01-11 DIAGNOSIS — F51.02 SLEEP STATE MISPERCEPTION: ICD-10-CM

## 2023-01-11 PROBLEM — G60.9 IDIOPATHIC PERIPHERAL NEUROPATHY: Status: ACTIVE | Noted: 2021-01-28

## 2023-01-11 PROBLEM — M47.817 LUMBOSACRAL SPONDYLOSIS: Status: ACTIVE | Noted: 2017-05-15

## 2023-01-11 PROBLEM — G89.29 CHRONIC LOW BACK PAIN: Status: ACTIVE | Noted: 2017-05-15

## 2023-01-11 PROBLEM — F41.8 ANXIETY ABOUT HEALTH: Status: ACTIVE | Noted: 2022-09-19

## 2023-01-11 PROBLEM — I50.32 CHRONIC DIASTOLIC HEART FAILURE (HCC): Status: ACTIVE | Noted: 2021-02-26

## 2023-01-11 PROBLEM — R45.89 ANXIETY ABOUT HEALTH: Status: ACTIVE | Noted: 2022-09-19

## 2023-01-11 PROBLEM — M54.50 CHRONIC LOW BACK PAIN: Status: ACTIVE | Noted: 2017-05-15

## 2023-01-11 PROCEDURE — 3078F DIAST BP <80 MM HG: CPT | Performed by: PSYCHIATRY & NEUROLOGY

## 2023-01-11 PROCEDURE — 99204 OFFICE O/P NEW MOD 45 MIN: CPT | Performed by: PSYCHIATRY & NEUROLOGY

## 2023-01-11 PROCEDURE — 3074F SYST BP LT 130 MM HG: CPT | Performed by: PSYCHIATRY & NEUROLOGY

## 2023-01-11 PROCEDURE — 1123F ACP DISCUSS/DSCN MKR DOCD: CPT | Performed by: PSYCHIATRY & NEUROLOGY

## 2023-01-11 RX ORDER — ESCITALOPRAM OXALATE 5 MG/1
TABLET ORAL
COMMUNITY
Start: 2022-08-30 | End: 2023-01-11

## 2023-01-11 RX ORDER — DOXEPIN HYDROCHLORIDE 3 MG/1
TABLET ORAL
Qty: 30 TABLET | Refills: 5 | Status: SHIPPED | OUTPATIENT
Start: 2023-01-11

## 2023-01-11 RX ORDER — LOSARTAN POTASSIUM 25 MG/1
25 TABLET ORAL 2 TIMES DAILY
COMMUNITY
Start: 2022-06-09 | End: 2023-04-07

## 2023-01-11 RX ORDER — LEVOTHYROXINE SODIUM 137 UG/1
TABLET ORAL
COMMUNITY
Start: 2022-04-19 | End: 2023-04-20

## 2023-01-11 RX ORDER — ATORVASTATIN CALCIUM 10 MG/1
TABLET, FILM COATED ORAL
COMMUNITY

## 2023-01-11 RX ORDER — FLUTICASONE PROPIONATE 50 MCG
2 SPRAY, SUSPENSION (ML) NASAL DAILY
COMMUNITY
Start: 2022-09-17

## 2023-01-11 RX ORDER — MINOCYCLINE HYDROCHLORIDE 100 MG/1
TABLET ORAL
COMMUNITY

## 2023-01-11 RX ORDER — LANSOPRAZOLE 30 MG/1
30 CAPSULE, DELAYED RELEASE ORAL
COMMUNITY
Start: 2005-03-16

## 2023-01-11 ASSESSMENT — ENCOUNTER SYMPTOMS
SHORTNESS OF BREATH: 1
EYES NEGATIVE: 1
GASTROINTESTINAL NEGATIVE: 1
ALLERGIC/IMMUNOLOGIC NEGATIVE: 1

## 2023-01-11 ASSESSMENT — SLEEP AND FATIGUE QUESTIONNAIRES
HOW LIKELY ARE YOU TO NOD OFF OR FALL ASLEEP WHILE SITTING AND READING: 0
HOW LIKELY ARE YOU TO NOD OFF OR FALL ASLEEP WHILE SITTING INACTIVE IN A PUBLIC PLACE: 0
HOW LIKELY ARE YOU TO NOD OFF OR FALL ASLEEP WHEN YOU ARE A PASSENGER IN A CAR FOR AN HOUR WITHOUT A BREAK: 0
HOW LIKELY ARE YOU TO NOD OFF OR FALL ASLEEP IN A CAR, WHILE STOPPED FOR A FEW MINUTES IN TRAFFIC: 0
ESS TOTAL SCORE: 0
HOW LIKELY ARE YOU TO NOD OFF OR FALL ASLEEP WHILE WATCHING TV: 0
HOW LIKELY ARE YOU TO NOD OFF OR FALL ASLEEP WHILE SITTING AND TALKING TO SOMEONE: 0
HOW LIKELY ARE YOU TO NOD OFF OR FALL ASLEEP WHILE LYING DOWN TO REST IN THE AFTERNOON WHEN CIRCUMSTANCES PERMIT: 0
NECK CIRCUMFERENCE (INCHES): 16
HOW LIKELY ARE YOU TO NOD OFF OR FALL ASLEEP WHILE SITTING QUIETLY AFTER LUNCH WITHOUT ALCOHOL: 0

## 2023-01-11 NOTE — PATIENT INSTRUCTIONS
Sleep compression 12 AM and 7:30 AM, no naps.   Take the doxepin at 11 PM      Orders Placed This Encounter   Procedures    Home Sleep Study     Standing Status:   Future     Standing Expiration Date:   1/11/2024     Order Specific Question:   Location For Sleep Study     Answer:   [place]     Order Specific Question:   Select Sleep Lab Location     Answer:   [facility] No

## 2023-01-11 NOTE — PROGRESS NOTES
MD JERRY Emerson Board Certified in Sleep Medicine  Certified [facility] Sleep Medicine  Board Certified in Neurology [address]  [address] [facility] [address] [facility], [place] [address] ([address]  [address], [address]           [address] [facility] [address] [address] [phone]    Subjective:     Patient ID: Jim Brannon is a 67 y.o. female. Chief Complaint   Patient presents with    Establish Care         HPI:        Jim Brannon is a 67 y.o. female referred by  self for a sleep evaluation. She complains of snorting, tossing and turning, difficulty falling asleep once awakened, feels herself she does not sleep but she denies snorting, choking, periods of not breathing, knees buckling with laughing, completely or partially paralyzed while falling asleep or waking up, take naps during the day, noisy environment, uncomfortable room temperature, uncomfortable bedding. Symptoms began several years ago, unchanged since that time. The patient has no bed-partner. SLEEP SCHEDULE: Goes to bed around 8:30-9:30 PM in the weekdays and 8:30-9:30 PM in the weekends. It usually takes the patient several hours  to fall asleep. The patient gets up 6 per night to go to the bathroom. The Patient finally gets up at 7:30 AM during the weekdays and 7:30 AM in the weekends. patient wakes up with dry mouth and sometimes morning headache. . the headache usually dull headache. 61  The patient has restless sleep with frequent arousals in addition to the Patient has significant daytime sleepiness. The Patient scored H[place] Sleepiness Score: 0 on Hensley Sleepiness Scale ( more than 10 is indicative of daytime sleepiness)and 61 in fatigue scale ( more than 36 is indicative of daytime fatigue). The patient takes no naps.       Previous evaluation and treatment has included- PSG 7 years ago. Insomnia with sleep initiation and maintaining, usually takes the patient several hours to fall in sleep, wakes up all night , usually stays in bed trying to fall in sleep, this might happened 7 nights a week. In the past:  The Ambien at 5 mg did not help, and had vivid dreams from 10 mg, but had anxiety despite of it helped her sleep. The Ativan 1 mg did help at ED, but the pills a home did not help. Hydroxyzine, and Remeron did not help. The Trazodone 25 mg did not help. Belsomra had constipation and did not help. The Patient has been obese for many years and tried, has gained few pounds in the last 5 years,      DOT/CDL - N/A  FAA/'slicense - N/A  The patient HTN is stable. H/o CHF last echo 11/04/2022  SUMMARY:    1. Left ventricular ejection fraction, by Story's Biplane, is normal at 62 %. 2. There is no evidence of pericardial effusion. 3. Left ventricular diastolic function is reduced. 4. Mild concentric left ventricular hypertrophy. 5. Normal right ventricular size, wall thickness, and systolic function. 6. The left atrium is mildly dilated by 2D measurement. 7. The right atrium is normal in size and structure. 8. The inferior vena cava is dilated.    Previous Report(s) Reviewed: historical medical records       Social History     Socioeconomic History    Marital status: Unknown     Spouse name: Not on file    Number of children: Not on file    Years of education: Not on file    Highest education level: Not on file   Occupational History    Not on file   Tobacco Use    Smoking status: Never     Passive exposure: Never    Smokeless tobacco: Never   Vaping Use    Vaping Use: Not on file   Substance and Sexual Activity    Alcohol use: Not Currently    Drug use: Never    Sexual activity: Not on file   Other Topics Concern    Not on file   Social History Narrative    Not on file     Social Determinants of Health     Financial Resource Strain: Not on file   Food Insecurity: Not on file   Transportation Needs: Not on file   Physical Activity: Not on file   Stress: Not on file   Social Connections: Not on file   Intimate Partner Violence: Not on file   Housing Stability: Not on file       Prior to Admission medications    Medication Sig Start Date End Date Taking? Authorizing Provider   fluticasone (FLONASE) 50 MCG/ACT nasal spray 2 sprays by Nasal route daily 9/17/22  Yes Historical Provider, MD   lansoprazole (PREVACID) 30 MG delayed release capsule Take 30 mg by mouth 3/16/05  Yes Historical Provider, MD   levothyroxine (SYNTHROID) 137 MCG tablet Synthroid 137 mcg tablet 4/19/22 4/20/23 Yes Historical Provider, MD   losartan (COZAAR) 25 MG tablet Take 25 mg by mouth 2 times daily 6/9/22 4/7/23 Yes Historical Provider, MD   doxepin (SILENOR) 3 MG TABS tablet Take it at 11 PM. 1/11/23  Yes Duane Jolly MD   atorvastatin (LIPITOR) 10 MG tablet atorvastatin 10 mg tablet   GENERIC FOR LIPITOR. TAKE 1 TABLET BY MOUTH EVERY DAY    Historical Provider, MD   Cholecalciferol 50 MCG (2000 UT) TABS Take 2,000 Units by mouth    Historical Provider, MD   minocycline (DYNACIN) 100 MG tablet minocycline 100 mg tablet   GENERIC FOR DYNACIN. TAKE 1 TABLET BY MOUTH TWICE DAILY    Historical Provider, MD       Allergies as of 01/11/2023    (No Known Allergies)       Patient Active Problem List   Diagnosis    Anxiety about health    Chronic low back pain    Chronic diastolic heart failure (HCC)    Essential hypertension    Hypercholesterolemia    Gastroesophageal reflux disease    Hypothyroidism    Idiopathic peripheral neuropathy    Lumbosacral spondylosis         No past medical history on file. No past surgical history on file. No family history on file. Review of Systems   Constitutional:  Positive for diaphoresis and fatigue. HENT: Negative. Eyes: Negative. Respiratory:  Positive for shortness of breath.     Cardiovascular:  Positive for leg swelling. Gastrointestinal: Negative. Endocrine: Positive for cold intolerance and heat intolerance. Genitourinary: Negative. Musculoskeletal:  Positive for myalgias. Allergic/Immunologic: Negative. Psychiatric/Behavioral:  Positive for agitation, decreased concentration and dysphoric mood. The patient is nervous/anxious. Objective:     Vitals:  Weight BMI Neck circumference    Wt Readings from Last 3 Encounters:   01/11/23 201 lb 6.4 oz (91.4 kg)    Body mass index is 35.12 kg/m². Neck circumference (Inches): 16     BP HR SaO2   BP Readings from Last 3 Encounters:   01/11/23 90/60    Pulse Readings from Last 3 Encounters:   01/11/23 90    SpO2 Readings from Last 3 Encounters:   01/11/23 94%        The mandibular molar Class :   [x]1 []2 []3      Mallampati I Airway Classification:   []1 [x]2 []3 []4        Physical Exam  Vitals and nursing note reviewed. Constitutional:       Appearance: Normal appearance. HENT:      Head: Atraumatic. Nose: Nose normal.      Mouth/Throat:      Comments: Mallampati class 2, no retrognathia or hypognathia , normal airflow in bilateral nostrils, no septum deviation , oropharynx is normal, no tonsils enlargement. Eyes:      Extraocular Movements: Extraocular movements intact. Cardiovascular:      Rate and Rhythm: Normal rate and regular rhythm. Pulmonary:      Effort: Pulmonary effort is normal.      Breath sounds: Normal breath sounds. Musculoskeletal:      Right lower leg: Edema (trace) present. Left lower leg: Edema (trace) present. Assessment:   Insomnia for several years as above, she spends 11 hours in bed. Suspected Obstructive sleep apnea especially with insomnia, daytime sleepiness, large neck circumference, Mallampati class of 2 and obesity. Diagnosis Orders   1. Chronic insomnia  doxepin (SILENOR) 3 MG TABS tablet      2. SARY (obstructive sleep apnea)  Home Sleep Study      3.  Sleep state misperception Plan:   Sleep compression 12 AM and 7:30 AM, no naps. Will try doxepin at 11 PM  Patient was counseled about the pathophysiology of obstructive sleep apnea syndrome and the methods for evaluating its presence and severity. Patient was counseled to avoid driving and other potentially hazardous circumstances if the patient is experiencing excessive sleepiness. Treatment considerations include the use of nasal CPAP, In the meantime, the patient should be cautioned to avoid the use of alcohol or other depressant medications because of potential for increasing the duration and severity of apnea and cautioned regarding driving or operating and dangerous equipment if the patient is experiencing daytime sleepiness. .       Orders Placed This Encounter   Procedures    Home Sleep Study         Return for insomnia, F/U between 31 and 90 days from the recieving CPAP.     Katy Sheriff MD  Medical Director - [facility]

## 2023-01-13 ENCOUNTER — OFFICE VISIT (OUTPATIENT)
Dept: SLEEP MEDICINE | Facility: HOSPITAL | Age: 73
End: 2023-01-13
Payer: MEDICARE

## 2023-01-13 VITALS — WEIGHT: 194 LBS | HEIGHT: 63 IN | HEART RATE: 76 BPM | OXYGEN SATURATION: 96 % | BODY MASS INDEX: 34.38 KG/M2

## 2023-01-13 DIAGNOSIS — G47.30 HYPERSOMNIA WITH SLEEP APNEA: Primary | ICD-10-CM

## 2023-01-13 DIAGNOSIS — G47.10 HYPERSOMNIA WITH SLEEP APNEA: Primary | ICD-10-CM

## 2023-01-13 DIAGNOSIS — F51.04 PSYCHOPHYSIOLOGICAL INSOMNIA: ICD-10-CM

## 2023-01-13 PROCEDURE — 99204 OFFICE O/P NEW MOD 45 MIN: CPT | Performed by: INTERNAL MEDICINE

## 2023-01-13 PROCEDURE — G0463 HOSPITAL OUTPT CLINIC VISIT: HCPCS

## 2023-01-13 NOTE — PROGRESS NOTES
[facility] New Patient/Consultation       Reason for Consultation: JOSIAS    Patient Care Team:  Edison Brewer III, NP-C as PCP - General (Internal Medicine)  Thierno James MD as Consulting Physician (Gastroenterology)  Francisco J Keller MD as Consulting Physician (Sleep Medicine)    Chief complaint: Unable to sleep    History of present illness:    Thank you for asking me to see your patient.  The patient is a 72 y.o. female who reports since 8/11/2022, she has not been able to sleep that she is aware of?  She states she has had weeks and weeks of sleepiness.  She also has difficulty with her GI system?    The patient presently lives in [place] but is trying to move back to [place] to be closer to relatives in [place] and [place].  She states she arrived yesterday or today?  She is presently in a hotel.  Records in care everywhere reviewed.  There is an office note dated 1/11/2023 from [facility] [place].  Additionally, multiple notes from many different specialties from many different health systems were reviewed.  A home sleep study was ordered at that time due to symptoms consistent with obstructive sleep apnea; insomnia with daytime sleepiness, [place] Sleepiness Scale normal at 0, large neck circumference and obesity.  The patient goes to bed between 9:30 PM and 10:30 PM and gets out of bed at 8:30 AM.  She states she does not fall asleep and she is tired upon arising.  The patient lives by herself but she has awaken snorting.  She has awaken gasping for breath and occasionally awaken choking.  She has a dry mouth in the morning.  Sometimes she has leg jerking.  She has back and right hip pain when she tries to sleep.  She grinds her teeth.  She has complaints of GUZMAN.  She has problems falling asleep and difficulty staying asleep and she will use the restroom 4-5 times during the nighttime.    The patient has taken Ambien 5 mg without benefit.  She also  has tried doxepin.  She is very concerned about her hypothyroidism and her Synthroid.  On 2023, TSH was 11.4.  On 2022, has TSH was 13.10 and on 2022 TSH was 11.    Review of Systems:    A complete review of systems was done and all were negative with the exception of frequent urination at night, nasal congestion and postnasal drip, swollen joint at times, swollen ankles toward the end of the day, shortness of breath with any exertion, the patient uses 2 canes to ambulate.  She has complaints of dizziness, anxiety and depression and rosacea.    History:  Past Medical History:   Diagnosis Date   • Anxiety    • Chronic diastolic heart failure (HCC)     stated in 2022 [facility] office note   • Essential hypertension     stated in 2022 [facility] office note   • Heart disease    • Hyperlipidemia    • Thyroid disease    ,   Past Surgical History:   Procedure Laterality Date   • APPENDECTOMY     •  SECTION      x2   • COLONOSCOPY     • HERNIA REPAIR     • OVARIAN CYST REMOVAL     • THYROIDECTOMY     • UPPER GASTROINTESTINAL ENDOSCOPY     ,   Family History   Problem Relation Age of Onset   • Stroke Mother    • Cancer Mother    • Heart disease Mother    • Cancer Father    • Colon polyps Brother    • Colon cancer Brother    • Crohn's disease Neg Hx    • Irritable bowel syndrome Neg Hx    • Ulcerative colitis Neg Hx     and   Social History     Socioeconomic History   • Marital status:    Tobacco Use   • Smoking status: Never   • Smokeless tobacco: Never   Vaping Use   • Vaping Use: Never used   Substance and Sexual Activity   • Alcohol use: Never   • Drug use: Never   • Sexual activity: Defer     E-cigarette/Vaping   • E-cigarette/Vaping Use Never User      E-cigarette/Vaping Substances     E-cigarette/Vaping Devices        Social History: Retired teacher; government worker, interviewer.  She denies caffeine.    Allergies:  Promethazine,  "Promethazine hcl, Ciprofloxacin, Hydromorphone hcl, Meperidine, Phenylephrine, Succinylcholine, Sulfa antibiotics, and Valacyclovir hcl     Medication Review: Synthroid, losartan, multivitamins, vitamin D    Vital Signs:    Vitals:    01/13/23 1331   Pulse: 76   SpO2: 96%   Weight: 88 kg (194 lb)   Height: 160 cm (63\")      Body mass index is 34.37 kg/m².  Neck Circumference: 15.5 inches      Physical Exam:    Constitutional:  Well developed 72 y.o. female that appears in no apparent distress.  Awake & oriented times 3.  Normal mood with normal recent and remote memory and normal judgement.  Eyes:  Conjunctivae normal.  Oropharynx: Moist mucous membranes without exudate and a large tongue and class III Mallampati airway.  Patient wearing a facemask.  Neck: Trachea midline  Respiratory: Effort is not labored  Cardiovascular: Radial pulse regular  Musculoskeletal: Gait appears normal, no digital clubbing evident, trace pre-tibial edema, and she ambulates with the help of 2 canes.    Impression:   The patient describes symptoms and she has signs of sleep disordered breathing.  She also has complaints of hypersomnolence but her [place] Sleepiness Scale is normal.  She has complaints of sleep onset and sleep maintenance insomnia; psychophysiologic insomnia.  She feels like she does not sleep and therefore, sleep state misperception questioned.    Plan:  Good sleep hygiene measures should be maintained.  Weight loss would be beneficial in this patient who is obese by Body mass index is 34.37 kg/m²..    Pathophysiology of JOSIAS described to the patient.  Cardiovascular complications of untreated JOSIAS also reviewed.  I especially described how untreated JOSIAS contributes to hypertension.  Additionally, I explained how JOSIAS contributes to sleep maintenance insomnia.  I also described how untreated JOSIAS contributes to nocturia.    It is noted that the patient was recently seen in [place] for similar problem and home sleep study " scheduled.  She has not followed through with that.  Due to the patient's complaints, due to her past medical history, I would recommend an attended overnight polysomnogram to truly evaluate sleep with sleep stages.  Additionally, if obstructive sleep apnea identified, split-night study to be performed.  I answered all of the patient's questions.    Thank you for requesting me to assist in this patient's care.    Francisco J Keller MD  Sleep Medicine  01/13/23  13:37 EST

## 2023-01-14 PROBLEM — G47.30 HYPERSOMNIA WITH SLEEP APNEA: Status: ACTIVE | Noted: 2023-01-14

## 2023-01-14 PROBLEM — F51.04 PSYCHOPHYSIOLOGICAL INSOMNIA: Status: ACTIVE | Noted: 2023-01-14

## 2023-01-14 PROBLEM — G47.10 HYPERSOMNIA WITH SLEEP APNEA: Status: ACTIVE | Noted: 2023-01-14

## 2023-01-16 ENCOUNTER — OFFICE VISIT (OUTPATIENT)
Dept: INTERNAL MEDICINE CLINIC | Age: 73
End: 2023-01-16
Payer: COMMERCIAL

## 2023-01-16 VITALS
OXYGEN SATURATION: 96 % | WEIGHT: 211.4 LBS | BODY MASS INDEX: 36.86 KG/M2 | DIASTOLIC BLOOD PRESSURE: 78 MMHG | HEART RATE: 67 BPM | RESPIRATION RATE: 12 BRPM | SYSTOLIC BLOOD PRESSURE: 118 MMHG

## 2023-01-16 DIAGNOSIS — E78.00 HYPERCHOLESTEROLEMIA: Primary | ICD-10-CM

## 2023-01-16 DIAGNOSIS — Z91.81 AT HIGH RISK FOR FALLS: ICD-10-CM

## 2023-01-16 DIAGNOSIS — F33.1 MODERATE EPISODE OF RECURRENT MAJOR DEPRESSIVE DISORDER (HCC): ICD-10-CM

## 2023-01-16 DIAGNOSIS — G60.9 IDIOPATHIC PERIPHERAL NEUROPATHY: ICD-10-CM

## 2023-01-16 DIAGNOSIS — E66.01 MORBID (SEVERE) OBESITY DUE TO EXCESS CALORIES (HCC): ICD-10-CM

## 2023-01-16 DIAGNOSIS — M15.9 PRIMARY OSTEOARTHRITIS INVOLVING MULTIPLE JOINTS: ICD-10-CM

## 2023-01-16 DIAGNOSIS — I10 ESSENTIAL HYPERTENSION: ICD-10-CM

## 2023-01-16 DIAGNOSIS — Z12.39 SCREENING BREAST EXAMINATION: ICD-10-CM

## 2023-01-16 DIAGNOSIS — Z12.31 ENCOUNTER FOR SCREENING MAMMOGRAM FOR MALIGNANT NEOPLASM OF BREAST: ICD-10-CM

## 2023-01-16 DIAGNOSIS — G47.10 HYPERSOMNIA WITH SLEEP APNEA: ICD-10-CM

## 2023-01-16 DIAGNOSIS — K57.92 DIVERTICULITIS: ICD-10-CM

## 2023-01-16 DIAGNOSIS — G47.30 HYPERSOMNIA WITH SLEEP APNEA: ICD-10-CM

## 2023-01-16 DIAGNOSIS — F41.9 ANXIETY: ICD-10-CM

## 2023-01-16 DIAGNOSIS — G60.9 IDIOPATHIC NEUROPATHY: ICD-10-CM

## 2023-01-16 DIAGNOSIS — R26.89 BALANCE DISORDER: ICD-10-CM

## 2023-01-16 DIAGNOSIS — Z78.0 POSTMENOPAUSAL: ICD-10-CM

## 2023-01-16 DIAGNOSIS — M47.897 OTHER OSTEOARTHRITIS OF SPINE, LUMBOSACRAL REGION: ICD-10-CM

## 2023-01-16 DIAGNOSIS — K21.9 GASTROESOPHAGEAL REFLUX DISEASE WITHOUT ESOPHAGITIS: ICD-10-CM

## 2023-01-16 DIAGNOSIS — Z86.010 HISTORY OF COLON POLYPS: ICD-10-CM

## 2023-01-16 DIAGNOSIS — I50.32 CHRONIC DIASTOLIC HEART FAILURE (HCC): ICD-10-CM

## 2023-01-16 DIAGNOSIS — K59.09 CHRONIC CONSTIPATION: ICD-10-CM

## 2023-01-16 DIAGNOSIS — E03.9 ACQUIRED HYPOTHYROIDISM: ICD-10-CM

## 2023-01-16 PROBLEM — F51.04 PSYCHOPHYSIOLOGICAL INSOMNIA: Status: ACTIVE | Noted: 2023-01-14

## 2023-01-16 PROBLEM — Z86.0100 HISTORY OF COLON POLYPS: Status: ACTIVE | Noted: 2023-01-16

## 2023-01-16 PROBLEM — M15.0 PRIMARY OSTEOARTHRITIS INVOLVING MULTIPLE JOINTS: Status: ACTIVE | Noted: 2023-01-16

## 2023-01-16 PROCEDURE — 1123F ACP DISCUSS/DSCN MKR DOCD: CPT | Performed by: INTERNAL MEDICINE

## 2023-01-16 PROCEDURE — 3078F DIAST BP <80 MM HG: CPT | Performed by: INTERNAL MEDICINE

## 2023-01-16 PROCEDURE — 3074F SYST BP LT 130 MM HG: CPT | Performed by: INTERNAL MEDICINE

## 2023-01-16 PROCEDURE — 99205 OFFICE O/P NEW HI 60 MIN: CPT | Performed by: INTERNAL MEDICINE

## 2023-01-16 RX ORDER — DOCUSATE SODIUM 100 MG/1
200 CAPSULE, LIQUID FILLED ORAL DAILY PRN
COMMUNITY

## 2023-01-16 RX ORDER — LEVOTHYROXINE SODIUM 0.15 MG/1
150 TABLET ORAL DAILY
COMMUNITY

## 2023-01-16 SDOH — ECONOMIC STABILITY: FOOD INSECURITY: WITHIN THE PAST 12 MONTHS, YOU WORRIED THAT YOUR FOOD WOULD RUN OUT BEFORE YOU GOT MONEY TO BUY MORE.: NEVER TRUE

## 2023-01-16 SDOH — ECONOMIC STABILITY: FOOD INSECURITY: WITHIN THE PAST 12 MONTHS, THE FOOD YOU BOUGHT JUST DIDN'T LAST AND YOU DIDN'T HAVE MONEY TO GET MORE.: NEVER TRUE

## 2023-01-16 ASSESSMENT — PATIENT HEALTH QUESTIONNAIRE - PHQ9
1. LITTLE INTEREST OR PLEASURE IN DOING THINGS: 0
SUM OF ALL RESPONSES TO PHQ QUESTIONS 1-9: 1
2. FEELING DOWN, DEPRESSED OR HOPELESS: 1
SUM OF ALL RESPONSES TO PHQ9 QUESTIONS 1 & 2: 1

## 2023-01-16 ASSESSMENT — SOCIAL DETERMINANTS OF HEALTH (SDOH): HOW HARD IS IT FOR YOU TO PAY FOR THE VERY BASICS LIKE FOOD, HOUSING, MEDICAL CARE, AND HEATING?: NOT HARD AT ALL

## 2023-01-16 NOTE — PROGRESS NOTES
Chief Complaint   Patient presents with    New Patient       HPI: Here to establish PCP  in process of moving from [place], but hasn't established permanent residence, and for followup and management of multiple chronic conditions as per the active problems list on chart, which I reviewed and updated with the patient today. States doing well with no new concerns except if noted below. Working on move from [place], and establishing new doctors in this area. So far has seen GI Dr Marcell Brewer, and sleep Dr Kirt Lundborg. States some health decline overall which   started when her diverticulitis flared up about 6 months ago and altered  her typical healthy diet, and was given prep with PEG (though she is known allergic to it). [facility] doctors are hard to get in with in [place] [address] so she thinks can get more done here. I have reviewed the chart notes available from myself and other providers. I have reviewed and addressed all active problems and created or updated the problems list in detail, as needed. No problem-specific Assessment & Plan notes found for this encounter. I have extensively reviewed and reconciled the medication list, discontinued medications not taking or no longer appropriate, and updated the active meds list      No results found for: LABA1C, LABMICR    No results found for: NA, K, CL, CO2, BUN, CREATININE, GLUCOSE, CALCIUM    No results found for: CHOL, TRIG, HDL, LDLCALC, LDLDIRECT    No results found for: ALT, AST    No results found for: TSH, T4FREE, T3FREE    No results found for: WBC, HGB, HCT, MCV, PLT    No results found for: INR     No results found for: PSA     No results found for: LABURIC          /78   Pulse 67   Resp 12   Wt 211 lb 6.4 oz (95.9 kg)   SpO2 96%   BMI 36.86 kg/m²   Body mass index is 36.86 kg/m². Physical Exam  Constitutional:       General: She is not in acute distress. Appearance: She is obese. HENT:      Head: Normocephalic and atraumatic.       Right Ear: Tympanic membrane, ear canal and external ear normal.      Left Ear: Tympanic membrane, ear canal and external ear normal.      Nose:      Comments: masked  Eyes:      General: No scleral icterus. Right eye: No discharge. Left eye: No discharge. Extraocular Movements: Extraocular movements intact. Conjunctiva/sclera: Conjunctivae normal.      Pupils: Pupils are equal, round, and reactive to light. Neck:      Thyroid: No thyromegaly. Vascular: No JVD. Trachea: No tracheal deviation. Cardiovascular:      Rate and Rhythm: Normal rate and regular rhythm. Heart sounds: Normal heart sounds. No murmur heard. No friction rub. No gallop. Pulmonary:      Effort: Pulmonary effort is normal. No respiratory distress. Breath sounds: Normal breath sounds. No wheezing or rales. Chest:      Chest wall: No tenderness. Abdominal:      General: Bowel sounds are normal. There is no distension. Palpations: Abdomen is soft. There is no mass. Tenderness: There is no abdominal tenderness (LLQ). There is no guarding or rebound. Musculoskeletal:         General: No tenderness. Normal range of motion. Cervical back: Neck supple. Right lower leg: No edema. Left lower leg: No edema. Lymphadenopathy:      Cervical: No cervical adenopathy. Skin:     General: Skin is warm and dry. Coloration: Skin is not pale. Findings: No erythema or rash. Neurological:      Mental Status: She is alert and oriented to person, place, and time. Mental status is at baseline. Cranial Nerves: No cranial nerve deficit. Motor: No abnormal muscle tone. Coordination: Coordination normal.      Gait: Gait abnormal.      Deep Tendon Reflexes: Reflexes are normal and symmetric.  Reflexes normal.      Comments: Walks cautiously and bent forward at hips, difficulty mounting exam table   Psychiatric:         Mood and Affect: Mood normal.         Behavior: Behavior normal.         Thought Content: Thought content normal.         Judgment: Judgment normal.       ASSESSMENT AND PLANS:      Except as noted below, all chronic problems have been reviewed and are stable to continue medications or other therapy as previously documented in the patient's chart, with changes per orders or documentation below:        Assessment and Plan: Patient received counseling and, if relevant,printed instructions for all symptoms listed in CC and HPI, as well as for all diagnoses brought onto today's visit note below. Typical counseling includes, but is not limited to, non pharmacologic measures to manage listed symptoms and conditions; appropriate use, risks and benefits for all prescribed medications; potential interactions between medications both prescribed and OTC; diet; exercise; healthy behaviors; and goalsetting to improve health. Pt.or responsible party was involved in shared decision making and had opportunity to have all questions answered. Very lengthy appointment due to patient with many questions and lengthy history. She is returning to [place] so will need to reschedule followup here when she returns permanently. 1. Hypercholesterolemia    2. At high risk for falls    3. Chronic diastolic heart failure ([address]    4. Acquired hypothyroidism    5. Gastroesophageal reflux disease without esophagitis    6. Essential hypertension    7. Diverticulitis    8. Chronic constipation    9. Balance disorder    10. Primary osteoarthritis involving multiple joints    11. Idiopathic neuropathy    12. Idiopathic peripheral neuropathy    13. Other osteoarthritis of spine, lumbosacral region    14. Hypersomnia with sleep apnea    15. History of colon polyps    16. Moderate episode of recurrent major depressive disorder ([address]    17. Anxiety    18. Postmenopausal  - DEXA BONE DENSITY 2 SITES; Future    19.  Encounter for screening mammogram for malignant neoplasm of breast   - [facility]IS DIGITAL SCREEN W OR WO CAD BILATERAL; Future    20. Screening breast examination  - [facility] DIGITAL SCREEN W OR WO CAD BILATERAL; Future    21. Morbid (severe) obesity due to excess calories (HCC)            Problem List       Anxiety    Chronic diastolic heart failure (HCC)    Relevant Medications    losartan (COZAAR) 25 MG tablet    Essential hypertension    Hypercholesterolemia - Primary    Relevant Medications    losartan (COZAAR) 25 MG tablet    Gastroesophageal reflux disease    Relevant Medications    docusate sodium (COLACE) 100 MG capsule    Probiotic Product (PROBIOTIC-10 ULTIMATE PO)    Hypothyroidism    Relevant Medications    levothyroxine (SYNTHROID) 150 MCG tablet    Idiopathic neuropathy    Lumbosacral spondylosis    Hypersomnia with sleep apnea    Diverticulitis    Chronic constipation    Relevant Medications    docusate sodium (COLACE) 100 MG capsule    Balance disorder    Primary osteoarthritis involving multiple joints    History of colon polyps    At high risk for falls    Moderate episode of recurrent major depressive disorder (HCC)    Morbid (severe) obesity due to excess calories ([facility] [address] [facility]     Orders Placed This Encounter   Procedures    PHYLLIS DIGITAL SCREEN W OR WO CAD BILATERAL     Standing Status:   Future     Standing Expiration Date:   3/18/2024     Order Specific Question:   Reason for exam:     Answer:   screen breast cancer    DEXA BONE DENSITY 2 SITES     Standing Status:   Future     Standing Expiration Date:   1/16/2024       I have reconciled the medications in chart with what patient reports to be taking, andreviewed action/ sideeffects and how to take any new medications. Patient/caregiver understands purpose and side effects. A complete  list of medications was provided in their after-visit summary. Return if symptoms worsen or fail to improve, for schedule when you are moved back permanently.     Time basedbilling: I spent over 75 minutes with this patient, and as is the nature of primary care and typical for my extended visits, over 50 percent of this visit was spent on counseling and coordination ofcare.                   On the basis of positive falls risk screening, assessment and plan is as follows: patient declines any further evaluation/treatment for increased falls risk. She is using two canes and doesn't want to change to walker.

## 2023-01-17 ENCOUNTER — HOSPITAL ENCOUNTER (OUTPATIENT)
Dept: SLEEP MEDICINE | Facility: HOSPITAL | Age: 73
Discharge: HOME | End: 2023-01-17
Admitting: INTERNAL MEDICINE
Payer: MEDICARE

## 2023-01-17 DIAGNOSIS — G47.10 HYPERSOMNIA WITH SLEEP APNEA: ICD-10-CM

## 2023-01-17 DIAGNOSIS — G47.30 HYPERSOMNIA WITH SLEEP APNEA: ICD-10-CM

## 2023-01-17 PROCEDURE — 95811 POLYSOM 6/>YRS CPAP 4/> PARM: CPT | Performed by: INTERNAL MEDICINE

## 2023-01-17 PROCEDURE — 95811 POLYSOM 6/>YRS CPAP 4/> PARM: CPT

## 2023-01-18 ENCOUNTER — TELEPHONE (OUTPATIENT)
Dept: SLEEP MEDICINE | Facility: HOSPITAL | Age: 73
End: 2023-01-18
Payer: MEDICARE

## 2023-01-24 ENCOUNTER — TELEPHONE (OUTPATIENT)
Dept: SLEEP CENTER | Age: 73
End: 2023-01-24

## 2023-01-24 NOTE — TELEPHONE ENCOUNTER
I called patient on 1/23/2023 to confirm her HST  on 1/24. Pt seemed to be very confused and could not tell me if she could or could not come, sat in silence a long time and she apologized as she said was trying to figure out a day she could come in. I was on the phone for her about 15-20 mins and phone disconnected. We were unable to get thru to pt. Until 1/24/2023 as pt called said she could not come today but again had trouble figuring out a day to come, pt again kept asking us to hold and phone was silence until we called her name out. I reviewed chart and saw she is also seeing sleep Dr in [place]. Pt also spoke with Belem Mijares yesterday and stated she's shaky and had not eaten since breakfast.  I notified pts son and advised him of what we experienced, he stated unfortunately that is how his mom is but will call and check on her.

## 2023-01-31 NOTE — FLOWSHEET NOTE
Pt has history of sleep apnea- does not have cpap machine    C-diff Questionnaire:     * Admitted with diarrhea? [] YES    [x]  NO     *Prior history of C-Diff. In last 3 months? [] YES    [x]  NO     *Antibiotic use in the past 6-8 weeks? [x]  NO    []  YES      If yes, which: REASON_________________     *Prior hospitalization or nursing home in the last month? []  YES    [x]  NO     SAFETY FIRST. .call before you fall    [address] time__12        Surgery time__1345    Do not eat or drink anything after 12:00 midnight prior to your surgery. This includes water chewing gum, mints and ice chips- the Day of Surgery. You may brush your teeth and gargle the morning of your surgery, but do not swallow the water     Please see your family doctor/pediatrician for a history and physical and/or questions concerning medications. Bring any test results/reports from your physicians office. If you are under the care of a heart doctor or specialist doctor, please be aware that you may be asked to them for clearance    You may be asked to stop blood thinners such as Coumadin, Plavix, Fragmin, Lovenox, etc., or any anti-inflammatories such as:  Aspirin, Ibuprofen, Advil, Naproxen prior to your surgery. We also ask that you stop any OTC medications such as fish oil, vitamin E, glucosamine, garlic, Multivitamins, COQ 10, etc.    We ask that you do not smoke 24 hours prior to surgery  We ask that you do not  drink any alcoholic beverages 24 hours prior to surgery     You must make arrangements for a responsible adult to take you home after your surgery. For your safety you will not be allowed to leave alone or drive yourself home. Your surgery will be cancelled if you do not have a ride home. Also for your safety, it is strongly suggested that someone stay with you the first 24 hours after your surgery.      A parent or legal guardian must accompany a child scheduled for surgery and plan to stay at the hospital until the child is discharged. Please do not bring other children with you. For your comfort, please wear simple loose fitting clothing to the hospital.  Please do not bring valuables. Do not wear any make-up or nail polish on your fingers or toes. For your safety, please do not wear any jewelry or body piercing's on the day of surgery. All jewelry must be removed. If you have dentures, they will be removed before going to operating room. For your convenience, we will provide you with a container. If you wear contact lenses or glasses, they will be removed, please bring a case for them. If you have a living will and a durable power of  for healthcare, please bring in a copy. As part of our patient safety program to minimize surgical site infections, we ask you to do the following:    Please notify your surgeon if you develop any illness between         now and the day of your surgery. This includes a cough, cold, fever, sore throat, nausea,         or vomiting, and diarrhea, etc.   Please notify your surgeon if you experience dizziness, shortness         of breath or blurred vision between now and the time of your surgery. Do not shave your operative site 96 hours prior to surgery. For face and neck surgery, men may use an electric razor 48 hours   prior to surgery. You may shower the night before surgery or the morning of   your surgery with an antibacterial soap. You will need to bring a photo ID and insurance card     If you use a C-pap or Bi-pap machine, please bring your machine with you to the hospital     Our goal is to provide you with excellent care, therefore, visitors will be limited to so that we may focus on providing this care for you. Please contact your surgeon office, if you have any further questions.                  [facility] phone number:  [address] PAT fax number:  [phone]    Please note these are generalized instructions for all surgical cases, you may be provided with more specific instructions according to your surgery.

## 2023-02-02 ENCOUNTER — HOSPITAL ENCOUNTER (OUTPATIENT)
Dept: SLEEP CENTER | Age: 73
Discharge: HOME | End: 2023-02-02

## 2023-02-02 DIAGNOSIS — G47.33 OSA (OBSTRUCTIVE SLEEP APNEA): ICD-10-CM

## 2023-02-03 ENCOUNTER — TELEPHONE (OUTPATIENT)
Dept: PULMONOLOGY | Age: 73
End: 2023-02-03

## 2023-02-03 NOTE — TELEPHONE ENCOUNTER
Pt want Dr. Napolean Krabbe to call her. Pt has questions about her medication Doxypine. Pt informed that Arina Parkers not in office today.

## 2023-02-03 NOTE — TELEPHONE ENCOUNTER
Looks patient is seeing both Dr Marisol Chauhan and another sleep dr.  Here is part of Dr Lisa Shaw note from 1/18/2023:  The patient presently lives in [place] but is trying to move back to [place] to be closer to relatives in [facility]. She   states she arrived yesterday or today? She is presently in a hotel. Records in care everywhere reviewed. There is an office note dated   1/11/2023 from [address] [place] [id number].  Additionally, multiple   notes from many different specialties from many different health systems   were reviewed. Tremaine would not tell MA what her question is.

## 2023-02-06 ENCOUNTER — TELEPHONE (OUTPATIENT)
Dept: INTERNAL MEDICINE CLINIC | Age: 73
End: 2023-02-06

## 2023-02-06 NOTE — TELEPHONE ENCOUNTER
Has seen a new sleep physician after 2 days from seeing us, she cancelled the HST twice, she has to follow up with the new sleep physician for the best of the patient's interests, I signed off in this patient.

## 2023-02-06 NOTE — TELEPHONE ENCOUNTER
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 I informed her Dr. Kun Baldwin is out of the office but I could schedule her for a virtual visit if needed  on 1/08/23 with Dr. Nehal Gao. I invited her to do an evisit visit through her my chart she declined. I advised to take a covid test and treat symptoms with OTC medication and call back if not feeling better.

## 2023-02-07 NOTE — TELEPHONE ENCOUNTER
After speaking with Dr. Dick Nguyen earlier today, he feels that Jody Navarrete should return to the sleep physician she saw down in [place]. I called and spoke to Jody Navarrete and relayed the message again, that we will be dismissing her from the practice and that Dr. Marisa Knowles is referring her back to the sleep physician she saw in [place]. She said ok and thanks.

## 2023-02-07 NOTE — TELEPHONE ENCOUNTER
Hong Ledezma called in again this morning after being notified yesterday that she has been dismissed from the practice. She said she's typing a message in [employer] to see if he will reconsider. Advised her she's been dismissed again.

## 2023-02-09 ENCOUNTER — ANESTHESIA EVENT (OUTPATIENT)
Dept: ENDOSCOPY | Age: 73
End: 2023-02-09
Payer: MEDICARE

## 2023-02-10 ENCOUNTER — ANESTHESIA (OUTPATIENT)
Dept: ENDOSCOPY | Age: 73
End: 2023-02-10
Payer: MEDICARE

## 2023-02-10 ENCOUNTER — HOSPITAL ENCOUNTER (OUTPATIENT)
Age: 73
Setting detail: OUTPATIENT SURGERY
Discharge: HOME | End: 2023-02-10
Attending: INTERNAL MEDICINE | Admitting: INTERNAL MEDICINE
Payer: MEDICARE

## 2023-02-10 VITALS
OXYGEN SATURATION: 97 % | SYSTOLIC BLOOD PRESSURE: 142 MMHG | TEMPERATURE: 97.5 F | HEIGHT: 63 IN | HEART RATE: 66 BPM | RESPIRATION RATE: 18 BRPM | BODY MASS INDEX: 37.21 KG/M2 | WEIGHT: 210 LBS | DIASTOLIC BLOOD PRESSURE: 89 MMHG

## 2023-02-10 DIAGNOSIS — K21.9 GASTROESOPHAGEAL REFLUX DISEASE, UNSPECIFIED WHETHER ESOPHAGITIS PRESENT: ICD-10-CM

## 2023-02-10 DIAGNOSIS — K57.32 DIVERTICULITIS, COLON: ICD-10-CM

## 2023-02-10 PROCEDURE — 3609010600 HC COLONOSCOPY POLYPECTOMY SNARE/COLD BIOPSY: Performed by: INTERNAL MEDICINE

## 2023-02-10 PROCEDURE — 7100000010 HC PHASE II RECOVERY - FIRST 15 MIN: Performed by: INTERNAL MEDICINE

## 2023-02-10 PROCEDURE — 7100000011 HC PHASE II RECOVERY - ADDTL 15 MIN: Performed by: INTERNAL MEDICINE

## 2023-02-10 PROCEDURE — 6360000002 HC RX W HCPCS: Performed by: NURSE ANESTHETIST, CERTIFIED REGISTERED

## 2023-02-10 PROCEDURE — 7100000001 HC PACU RECOVERY - ADDTL 15 MIN: Performed by: INTERNAL MEDICINE

## 2023-02-10 PROCEDURE — 3609012400 HC EGD TRANSORAL BIOPSY SINGLE/MULTIPLE: Performed by: INTERNAL MEDICINE

## 2023-02-10 PROCEDURE — 3609010300 HC COLONOSCOPY W/BIOPSY SINGLE/MULTIPLE: Performed by: INTERNAL MEDICINE

## 2023-02-10 PROCEDURE — 88305 TISSUE EXAM BY PATHOLOGIST: CPT

## 2023-02-10 PROCEDURE — 2709999900 HC NON-CHARGEABLE SUPPLY: Performed by: INTERNAL MEDICINE

## 2023-02-10 PROCEDURE — 3700000001 HC ADD 15 MINUTES (ANESTHESIA): Performed by: INTERNAL MEDICINE

## 2023-02-10 PROCEDURE — 6370000000 HC RX 637 (ALT 250 FOR IP): Performed by: INTERNAL MEDICINE

## 2023-02-10 PROCEDURE — 7100000000 HC PACU RECOVERY - FIRST 15 MIN: Performed by: INTERNAL MEDICINE

## 2023-02-10 PROCEDURE — 2580000003 HC RX 258: Performed by: NURSE ANESTHETIST, CERTIFIED REGISTERED

## 2023-02-10 PROCEDURE — 3700000000 HC ANESTHESIA ATTENDED CARE: Performed by: INTERNAL MEDICINE

## 2023-02-10 RX ORDER — PROPOFOL 10 MG/ML
INJECTION, EMULSION INTRAVENOUS PRN
Status: DISCONTINUED | OUTPATIENT
Start: 2023-02-10 | End: 2023-02-10 | Stop reason: SDUPTHER

## 2023-02-10 RX ORDER — SODIUM CHLORIDE 0.9 % (FLUSH) 0.9 %
5-40 SYRINGE (ML) INJECTION PRN
Status: DISCONTINUED | OUTPATIENT
Start: 2023-02-10 | End: 2023-02-10 | Stop reason: HOSPADM

## 2023-02-10 RX ORDER — PROPOFOL 10 MG/ML
INJECTION, EMULSION INTRAVENOUS CONTINUOUS PRN
Status: DISCONTINUED | OUTPATIENT
Start: 2023-02-10 | End: 2023-02-10 | Stop reason: SDUPTHER

## 2023-02-10 RX ORDER — SODIUM CHLORIDE 9 MG/ML
INJECTION, SOLUTION INTRAVENOUS PRN
Status: DISCONTINUED | OUTPATIENT
Start: 2023-02-10 | End: 2023-02-10 | Stop reason: HOSPADM

## 2023-02-10 RX ORDER — SODIUM CHLORIDE 9 MG/ML
INJECTION, SOLUTION INTRAVENOUS CONTINUOUS PRN
Status: DISCONTINUED | OUTPATIENT
Start: 2023-02-10 | End: 2023-02-10 | Stop reason: SDUPTHER

## 2023-02-10 RX ORDER — ONDANSETRON 2 MG/ML
4 INJECTION INTRAMUSCULAR; INTRAVENOUS
Status: DISCONTINUED | OUTPATIENT
Start: 2023-02-10 | End: 2023-02-10 | Stop reason: HOSPADM

## 2023-02-10 RX ORDER — SODIUM CHLORIDE 0.9 % (FLUSH) 0.9 %
5-40 SYRINGE (ML) INJECTION EVERY 12 HOURS SCHEDULED
Status: DISCONTINUED | OUTPATIENT
Start: 2023-02-10 | End: 2023-02-10 | Stop reason: HOSPADM

## 2023-02-10 RX ORDER — SIMETHICONE 20 MG/.3ML
EMULSION ORAL PRN
Status: DISCONTINUED | OUTPATIENT
Start: 2023-02-10 | End: 2023-02-10 | Stop reason: ALTCHOICE

## 2023-02-10 RX ADMIN — PROPOFOL 180 MCG/KG/MIN: 10 INJECTION, EMULSION INTRAVENOUS at 13:32

## 2023-02-10 RX ADMIN — PROPOFOL 100 MG: 10 INJECTION, EMULSION INTRAVENOUS at 13:32

## 2023-02-10 RX ADMIN — PROPOFOL 50 MG: 10 INJECTION, EMULSION INTRAVENOUS at 13:34

## 2023-02-10 RX ADMIN — SODIUM CHLORIDE: 9 INJECTION, SOLUTION INTRAVENOUS at 13:24

## 2023-02-10 ASSESSMENT — ENCOUNTER SYMPTOMS: SHORTNESS OF BREATH: 0

## 2023-02-10 ASSESSMENT — PAIN - FUNCTIONAL ASSESSMENT: PAIN_FUNCTIONAL_ASSESSMENT: 0-10

## 2023-02-10 ASSESSMENT — PAIN SCALES - GENERAL
PAINLEVEL_OUTOF10: 0
PAINLEVEL_OUTOF10: 0

## 2023-02-10 NOTE — OP NOTE
Esophagogastroduodenoscopy Note    Patient:   Hans Rust    :    1950    Facility:   [facility] [Outpatient]   Referring/PCP: No primary care provider on file. Procedure:   Esophagogastroduodenoscopy   Date:     2/10/2023   Endoscopist:  Kailey Eisenberg MD     Preoperative Diagnosis:    reflux and h/o hiatal hernia    Postoperative Diagnosis: Small hiatal hernia, changes suggestive of short segment Phelan's. Anesthesia:  MAC    Estimated blood loss: Minimal    Complications: None    Description of Procedure:  Informed consent was obtained from the patient after explanation of the procedure including indications, description of the procedure,  benefits and possible risks and complications of the procedure, and alternatives. Questions were answered. The patient's history was reviewed and a directed physical examination was performed prior to the procedure. Patient was monitored throughout the procedure with pulse oximetry and periodic assessment of vital signs. Patient was sedated as noted above. With the patient in the left lateral decubitus position, the Olympus videoendoscope was placed in the patient's mouth and under direct visualization passed into the esophagus. Visualization of the esophagus, stomach, and duodenum was performed during both introduction and withdrawal of the endoscope and retroflexed view of the proximal stomach was obtained. The scope was passed to the 2nd portion of the duodenum. The patient tolerated the procedure well and was taken to the recovery area in good condition. Findings: The mucosa in the esophagus is normal.  The squamocolumnar junction was closely examined. Changes suggestive of short segment Phelan's were noted in the distal esophagus. Therefore, biopsies were obtained and sent for pathology. There was no evidence of active esophagitis. There was no evidence of an esophageal stricture.   Examination of the stomach revealed a normal gastric mucosa. A small hiatal hernia is present. The gastric mucosa is normal.  There was no evidence of ulcer disease. The mucosa in the duodenal bulb and descending duodenum is normal.    Recommendations: Call in 1 week for pathology findings. If evidence of Phelan's esophagus on biopsies, will prescribe PPI.     Regina Corey MD, MD

## 2023-02-10 NOTE — H&P
[facility]   Attending Pre-operative History and Physical    INDICATION:  history of acid reflux and hiatal hernia, h/o diverticulitis    PROCEDURE:  EGD and colonoscopy    History Obtained From:  patient    HISTORY OF PRESENT ILLNESS:    The patient is a 67 y.o. female presents for an upper endoscopy and colonoscopy. Past Medical History:    Past Medical History:   Diagnosis Date    Anxiety     Arthritis     Balance disorder     Chronic constipation     Chronic diastolic heart failure (HCC)     Depression     Diverticulitis     GERD (gastroesophageal reflux disease)     Hyperlipidemia     Hypersomnia with sleep apnea     Hypertension     Idiopathic neuropathy     Idiopathic peripheral neuropathy     Morbid obesity ([address]     Risk for falls     Thyroid disease       Past Surgical History:    Past Surgical History:   Procedure Laterality Date    LAPAROSCOPY      OVARY REMOVAL      THYROIDECTOMY        Medications Prior to Admission:   Prior to Admission medications    Medication Sig Start Date End Date Taking?  Authorizing Provider   levothyroxine (SYNTHROID) 150 MCG tablet Take 150 mcg by mouth Daily    Historical Provider, MD   docusate sodium (COLACE) 100 MG capsule Take 200 mg by mouth 2 times daily    Historical Provider, MD   Probiotic Product (PROBIOTIC-10 ULTIMATE PO) Take by mouth    Historical Provider, MD   Cholecalciferol 50 MCG (2000 UT) TABS Take 2,000 Units by mouth    Historical Provider, MD   losartan (COZAAR) 25 MG tablet Take 25 mg by mouth 2 times daily 6/9/22 4/7/23  Historical Provider, MD        Allergies:  Levofloxacin, Meperidine and related, Phenothiazines, Promethazine, Succinylcholine chloride, Sulfa antibiotics, Ciprofloxacin, Ace inhibitors, Macrolides and ketolides, Morphine and related, Nystatin, Polyethylene glycol, Trazodone, Valacyclovir hcl, and Phenylephrine  History of allergic reaction to anesthesia:  No    Social History:   Social History     Socioeconomic History Marital status:      Spouse name: Not on file    Number of children: Not on file    Years of education: Not on file    Highest education level: Not on file   Occupational History    Not on file   Tobacco Use    Smoking status: Never     Passive exposure: Never    Smokeless tobacco: Never   Vaping Use    Vaping Use: Not on file   Substance and Sexual Activity    Alcohol use: Not Currently    Drug use: Never    Sexual activity: Not on file   Other Topics Concern    Not on file   Social History Narrative    Not on file     Social Determinants of Health     Financial Resource Strain: Low Risk     Difficulty of Paying Living Expenses: Not hard at all   Food Insecurity: No Food Insecurity    Worried About Running Out of Food in the Last Year: Never true    Ran Out of Food in the Last Year: Never true   Transportation Needs: Not on file   Physical Activity: Not on file   Stress: Not on file   Social Connections: Not on file   Intimate Partner Violence: Not on file   Housing Stability: Not on file      Family History:   History reviewed. No pertinent family history. REVIEW OF SYSTEMS:    Negative beyond above    PHYSICAL EXAM:      /60   Pulse 73   Temp 98.2 °F (36.8 °C) (Temporal)   Resp 18   Ht 5' 3\" (1.6 m)   Wt 210 lb (95.3 kg)   SpO2 96%   BMI 37.20 kg/m²  I      Head/ENT:  normocephalic, without obvious abnormalities, atraumatic    Heart:  normal S1 and S2    Lungs:  No increased work of breathing, good air exchange, clear to auscultation bilaterally,no crackles or wheezing    Abdomen:  Normal bowel sounds, soft nondistended, non tender    Extremities:  No clubbing, cyanosis, or edema      DATA:  reviewed    ASSESSMENT AND PLAN:    1. Patient is a 67 y.o. female with above specified procedure planned upper endoscopy and colonoscopy with deep sedation  2. Procedure options, risks and benefits reviewed with patient. Patient expresses understanding.

## 2023-02-10 NOTE — PROGRESS NOTES
Patient admitted to PACU # 9 from OR at 1310 post colonoscopy per [facility] Attached to PACU monitoring system and report received from anesthesia provider. Patient was reported to be hemodynamically stable during procedure. Patient drowsy on admission and denied pain.

## 2023-02-10 NOTE — OP NOTE
Colonoscopy Note    Patient:   Rui Guadalupe    :    1950    Facility:   [facility] [Outpatient]  Referring/PCP: No primary care provider on file. Procedure:   Colonoscopy   Date:     2/10/2023  Endoscopist:  Megan Runner, MD, MD    Preoperative Diagnosis: History of diverticulitis. History of colon polyps. Abdominal pain. Postoperative Diagnosis: Severe diverticular disease in the sigmoid colon. Scattered diverticular disease throughout the colon. 1 ascending colon polyp, 2 transverse colon polyps. Random biopsies obtained. Anesthesia: MAC    Estimated blood loss: Minimal    Complications:  None    Description of Procedure:  Informed consent was obtained from the patient after explanation of the procedure including indications, description of the procedure,  benefits and possible risks and complications of the procedure, and alternatives. Questions were answered. The patient's history was reviewed and a directed physical examination was performed prior to the procedure. Patient was monitored throughout the procedure with pulse oximetry and periodic assessment of vital signs. Patient was sedated as noted above. With the patient initially in the left lateral decubitus position, a digital rectal examination was performed and revealed negative without mass, lesions or tenderness. The Olympus video colonoscope was placed in the patient's rectum and advanced without difficulty  to the cecum, which was identified by the ileocecal valve and appendiceal orifice. The prep was excellent. Examination of the mucosa was performed during both introduction and withdrawal of the colonoscope. Retroflexed view of the rectum was performed. Findings: The mucosa throughout the colon is normal.  There was no evidence of colitis. Random biopsies were obtained to rule out microscopic colitis. Severe diverticular disease was present in the sigmoid colon.   Scattered diverticular disease was also present throughout the colon. A diminutive ascending colon polyp was removed with a biopsy forceps. 2 diminutive transverse colon polyps were removed with a cold snare. Specimens were recovered and sent for pathology. Moderate-sized internal hemorrhoids were noted on the retroflexed view. Recommendations: Call in 1 week for pathology findings. High-fiber diet. Fiber supplementation.     Leigh Molina MD, MD

## 2023-02-10 NOTE — PROGRESS NOTES
Received from PACU. Admitted to Phase 2 care. Awake and alert, respirations easy and even. Oriented to room and surroundings. No complaints. Dr. Greer Boyd into see patient.

## 2023-02-10 NOTE — DISCHARGE INSTRUCTIONS

## 2023-02-10 NOTE — PROGRESS NOTES
Tolerating oral intake and being up on side of bed. Discharge instructions given to patient and sister in-law. Verbalize understanding. No complaints.

## 2023-02-10 NOTE — ANESTHESIA PRE PROCEDURE
Department of Anesthesiology  Preprocedure Note       Name:  Kanwal Daily   Age:  67 y.o.  :  1950                                          MRN:  [id number]         Date:  2/10/2023      Surgeon: Mitra Torres):  Roman Joshua MD    Procedure: Procedure(s):  COLONOSCOPY  ESOPHAGOGASTRODUODENOSCOPY    Medications prior to admission:   Prior to Admission medications    Medication Sig Start Date End Date Taking? Authorizing Provider   levothyroxine (SYNTHROID) 150 MCG tablet Take 150 mcg by mouth Daily    Historical Provider, MD   docusate sodium (COLACE) 100 MG capsule Take 200 mg by mouth 2 times daily    Historical Provider, MD   Probiotic Product (PROBIOTIC-10 ULTIMATE PO) Take by mouth    Historical Provider, MD   Cholecalciferol 50 MCG ( UT) TABS Take 2,000 Units by mouth    Historical Provider, MD   losartan (COZAAR) 25 MG tablet Take 25 mg by mouth 2 times daily 22  Historical Provider, MD       Current medications:    Current Facility-Administered Medications   Medication Dose Route Frequency Provider Last Rate Last Admin    sodium chloride flush 0.9 % injection 5-40 mL  5-40 mL IntraVENous 2 times per day Thierry Chiu MD        sodium chloride flush 0.9 % injection 5-40 mL  5-40 mL IntraVENous PRN Thierry Chiu MD        0.9 % sodium chloride infusion   IntraVENous PRN Thierry Chiu MD           Allergies: Allergies   Allergen Reactions    Levofloxacin Nausea And Vomiting     Other reaction(s): Hypotension, Other-Reaction in Comments  \"ruptured bicep and tendon\"  Patient Reported Allergy via Notable  \"ruptured bicep and tendon\"      Meperidine And Related Anxiety and Other (See Comments)     Other reaction(s):  Other  Patient Reported Allergy via Notable  Loss of muscle control  Loose muscle control and lena  Loss of muscle control  Loose muscle control and lena      Phenothiazines Anaphylaxis    Promethazine Anaphylaxis     Family allergy      Succinylcholine Chloride      fam hx malign hypothemia    Sulfa Antibiotics Anaphylaxis and Other (See Comments)           Ciprofloxacin Nausea And Vomiting and Other (See Comments)     Other reaction(s): other  \"ruptured bicep and tendon\"  Patient Reported Allergy via Notable  Tendonitis- per patient report  Tendonitis- per patient report  Tendonitis- per patient report  \"ruptured bicep and tendon\"  Tendonitis- per patient report      Ace Inhibitors Cough     Other reaction(s): Cough      Macrolides And Ketolides     Morphine And Related      Other reaction(s): Unknown-Explain in Comments  Loss of muscle control  Tolerates Percocet per patient ([facility] ER visit 12/7/17)  Loss of muscle control and lena      Nystatin Swelling     Oral nystatin - lip swelling    Polyethylene Glycol      Throat and tongue burning    Trazodone Cough    Valacyclovir Hcl Headaches     Other reaction(s): Headache      Phenylephrine Palpitations and Other (See Comments)       Problem List:    Patient Active Problem List   Diagnosis Code    Anxiety F41.9    Chronic low back pain M54.50, G89.29    Chronic diastolic heart failure ([address] I50.32    Essential hypertension I10    Hypercholesterolemia E78.00    Gastroesophageal reflux disease K21.9    Hypothyroidism E03.9    Idiopathic neuropathy G60.9    Lumbosacral spondylosis M47.817    Hypersomnia with sleep apnea G47.10, G47.30    Psychophysiological insomnia F51.04    Diverticulitis K57.92    Chronic constipation K59.09    Balance disorder R26.89    Primary osteoarthritis involving multiple joints M15.9    History of colon polyps Z86.010    At high risk for falls Z91.81    Moderate episode of recurrent major depressive disorder ([address] F33.1    Morbid (severe) obesity due to excess calories ([address] E66.01       Past Medical History:        Diagnosis Date    Anxiety     Arthritis     Balance disorder     Chronic constipation     Chronic diastolic heart failure ([address]     Depression     Diverticulitis     GERD (gastroesophageal reflux disease)     Hyperlipidemia     Hypersomnia with sleep apnea     Hypertension     Idiopathic neuropathy     Idiopathic peripheral neuropathy     Morbid obesity (HCC)     Risk for falls     Thyroid disease        Past Surgical History:        Procedure Laterality Date    LAPAROSCOPY      OVARY REMOVAL      THYROIDECTOMY         Social History:    Social History     Tobacco Use    Smoking status: Never     Passive exposure: Never    Smokeless tobacco: Never   Substance Use Topics    Alcohol use: Not Currently                                Counseling given: Not Answered      Vital Signs (Current):   Vitals:    01/31/23 1313 02/10/23 1236   BP:  110/60   Pulse:  73   Resp:  18   Temp:  98.2 °F (36.8 °C)   TempSrc:  Temporal   SpO2:  96%   Weight: 211 lb (95.7 kg) 210 lb (95.3 kg)   Height: 5' 3\" (1.6 m) 5' 3\" (1.6 m)                                              BP Readings from Last 3 Encounters:   02/10/23 110/60   01/16/23 118/78   01/11/23 90/60       NPO Status: Time of last liquid consumption: 2000                        Time of last solid consumption: 2359                        Date of last liquid consumption: 02/09/23                        Date of last solid food consumption: 02/08/23    BMI:   Wt Readings from Last 3 Encounters:   02/10/23 210 lb (95.3 kg)   01/16/23 211 lb 6.4 oz (95.9 kg)   01/11/23 201 lb 6.4 oz (91.4 kg)     Body mass index is 37.2 kg/m². CBC: No results found for: WBC, RBC, HGB, HCT, MCV, RDW, PLT    CMP: No results found for: NA, K, CL, CO2, BUN, CREATININE, GFRAA, AGRATIO, LABGLOM, GLUCOSE, GLU, PROT, CALCIUM, BILITOT, ALKPHOS, AST, ALT    POC Tests: No results for input(s): POCGLU, POCNA, POCK, POCCL, POCBUN, POCHEMO, POCHCT in the last 72 hours.     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 Airway: Mallampati: II  TM distance: >3 FB   Neck ROM: full  Mouth opening: > = 3 FB   Dental:      Comment: No loose teeth    Pulmonary: breath sounds clear to auscultation      (-) COPD, asthma, shortness of breath, recent URI and sleep apnea                           Cardiovascular:    (+) hypertension:, CHF: diastolic,     (-) valvular problems/murmurs, past MI, CAD, CABG/stent, dysrhythmias and no pulmonary hypertension      Rhythm: regular  Rate: normal                    Neuro/Psych:   (+) neuromuscular disease:, psychiatric history:depression/anxiety    (-) seizures, TIA and CVA           GI/Hepatic/Renal:   (+) GERD:, bowel prep,      (-) PUD, hepatitis and liver disease       Endo/Other:    (+) hypothyroidism: arthritis:., .    (-) diabetes mellitus, hyperthyroidism, blood dyscrasia               Abdominal:             Vascular: Other Findings:           Anesthesia Plan      MAC     ASA 3       Induction: intravenous. Anesthetic plan and risks discussed with patient. Plan discussed with CRNA. This pre-anesthesia assessment may be used as a history and physical.    DOS STAFF ADDENDUM:    Pt seen and examined, chart reviewed (including anesthesia, drug and allergy history). No interval changes to history and physical examination. Anesthetic plan, risks, benefits, alternatives, and personnel involved discussed with patient. Patient verbalized an understanding and agrees to proceed.       Janice Lee MD  February 10, 2023  12:40 PM      Janice Lee MD 2/10/2023

## 2023-02-10 NOTE — ANESTHESIA POSTPROCEDURE EVALUATION
[facility] Department of Anesthesiology  Post-Anesthesia Note       Name:  Vijay Javed                                  Age:  67 y.o. MRN:  [id number]     Last Vitals & Oxygen Saturation: BP (!) 142/89   Pulse 66   Temp 97.5 °F (36.4 °C) (Temporal)   Resp 18   Ht 5' 3\" (1.6 m)   Wt 210 lb (95.3 kg)   SpO2 97%   BMI 37.20 kg/m²   Patient Vitals for the past 4 hrs:   BP Temp Temp src Pulse Resp SpO2   02/10/23 1517 (!) 142/89 -- -- 66 18 97 %   02/10/23 1445 (!) 161/66 97.5 °F (36.4 °C) Temporal 65 16 97 %   02/10/23 1440 128/76 -- -- 69 15 96 %   02/10/23 1430 126/68 -- -- 68 15 97 %   02/10/23 1425 121/71 -- -- 68 18 98 %   02/10/23 1421 126/71 -- -- 67 17 97 %   02/10/23 1420 126/71 -- -- 68 16 97 %   02/10/23 1415 116/74 -- -- 69 12 97 %   02/10/23 1410 110/68 97.1 °F (36.2 °C) -- 54 16 99 %       Level of consciousness:  Awake, alert    Respiratory: Respirations easy, no distress. Stable. Cardiovascular: Hemodynamically stable. Hydration: Adequate. PONV: Adequately managed. Post-op pain: Adequately controlled. Post-op assessment: Tolerated anesthetic well without complication. Complications:  None.     Ale Vizcaino MD  February 10, 2023   4:36 PM

## 2023-02-15 ENCOUNTER — TELEPHONE (OUTPATIENT)
Dept: ENT CLINIC | Age: 73
End: 2023-02-15

## 2023-02-15 ENCOUNTER — OFFICE VISIT (OUTPATIENT)
Dept: ENT CLINIC | Age: 73
End: 2023-02-15
Payer: MEDICARE

## 2023-02-15 VITALS
SYSTOLIC BLOOD PRESSURE: 121 MMHG | DIASTOLIC BLOOD PRESSURE: 74 MMHG | BODY MASS INDEX: 37.92 KG/M2 | HEIGHT: 63 IN | WEIGHT: 214 LBS | HEART RATE: 78 BPM | RESPIRATION RATE: 16 BRPM | OXYGEN SATURATION: 97 %

## 2023-02-15 DIAGNOSIS — H92.01 RIGHT EAR PAIN: Primary | ICD-10-CM

## 2023-02-15 DIAGNOSIS — K21.9 LARYNGOPHARYNGEAL REFLUX (LPR): ICD-10-CM

## 2023-02-15 DIAGNOSIS — Z86.69 HISTORY OF OTITIS EXTERNA: ICD-10-CM

## 2023-02-15 DIAGNOSIS — R09.81 NASAL CONGESTION: ICD-10-CM

## 2023-02-15 DIAGNOSIS — J34.2 DEVIATED NASAL SEPTUM: ICD-10-CM

## 2023-02-15 DIAGNOSIS — R13.10 DYSPHAGIA, UNSPECIFIED TYPE: ICD-10-CM

## 2023-02-15 DIAGNOSIS — R07.0 THROAT PAIN: ICD-10-CM

## 2023-02-15 DIAGNOSIS — R04.0 EPISTAXIS: ICD-10-CM

## 2023-02-15 DIAGNOSIS — H93.8X3 SENSATION OF FULLNESS IN BOTH EARS: ICD-10-CM

## 2023-02-15 DIAGNOSIS — J34.89 NASAL OBSTRUCTION: ICD-10-CM

## 2023-02-15 PROCEDURE — 99204 OFFICE O/P NEW MOD 45 MIN: CPT | Performed by: STUDENT IN AN ORGANIZED HEALTH CARE EDUCATION/TRAINING PROGRAM

## 2023-02-15 PROCEDURE — G8484 FLU IMMUNIZE NO ADMIN: HCPCS | Performed by: STUDENT IN AN ORGANIZED HEALTH CARE EDUCATION/TRAINING PROGRAM

## 2023-02-15 PROCEDURE — G8427 DOCREV CUR MEDS BY ELIG CLIN: HCPCS | Performed by: STUDENT IN AN ORGANIZED HEALTH CARE EDUCATION/TRAINING PROGRAM

## 2023-02-15 PROCEDURE — 3017F COLORECTAL CA SCREEN DOC REV: CPT | Performed by: STUDENT IN AN ORGANIZED HEALTH CARE EDUCATION/TRAINING PROGRAM

## 2023-02-15 PROCEDURE — 1124F ACP DISCUSS-NO DSCNMKR DOCD: CPT | Performed by: STUDENT IN AN ORGANIZED HEALTH CARE EDUCATION/TRAINING PROGRAM

## 2023-02-15 PROCEDURE — 1036F TOBACCO NON-USER: CPT | Performed by: STUDENT IN AN ORGANIZED HEALTH CARE EDUCATION/TRAINING PROGRAM

## 2023-02-15 PROCEDURE — 3074F SYST BP LT 130 MM HG: CPT | Performed by: STUDENT IN AN ORGANIZED HEALTH CARE EDUCATION/TRAINING PROGRAM

## 2023-02-15 PROCEDURE — 31231 NASAL ENDOSCOPY DX: CPT | Performed by: STUDENT IN AN ORGANIZED HEALTH CARE EDUCATION/TRAINING PROGRAM

## 2023-02-15 PROCEDURE — G8400 PT W/DXA NO RESULTS DOC: HCPCS | Performed by: STUDENT IN AN ORGANIZED HEALTH CARE EDUCATION/TRAINING PROGRAM

## 2023-02-15 PROCEDURE — 3078F DIAST BP <80 MM HG: CPT | Performed by: STUDENT IN AN ORGANIZED HEALTH CARE EDUCATION/TRAINING PROGRAM

## 2023-02-15 PROCEDURE — G8417 CALC BMI ABV UP PARAM F/U: HCPCS | Performed by: STUDENT IN AN ORGANIZED HEALTH CARE EDUCATION/TRAINING PROGRAM

## 2023-02-15 PROCEDURE — 1090F PRES/ABSN URINE INCON ASSESS: CPT | Performed by: STUDENT IN AN ORGANIZED HEALTH CARE EDUCATION/TRAINING PROGRAM

## 2023-02-15 NOTE — TELEPHONE ENCOUNTER
Pt calling with questions she forgot to ask dr Mariely Braswell at her appt. When she sleeps on her side, would the deviated septum affect her sleep? Would this contribute to her waking up at night? She talked to Dr Jossy Santana about her dry eyes,nose and mouth; she states he suggested seeing eye dr for dry eyes but she is wondering if the dry eyes/nose/mouth are all related to something other than needing a CPAP machine? She wants to know when she should schedule a follow up appt? (Patient is currently scheduled on 2/21/23 which was her \"original\" appt; she called, asking to get in sooner d/t ear pain, so was scheduled today to address this. Should she keep her f/u appt w/hearing eval on 2/21/23 or should this be rescheduled further out? Keep hearing eval or only do f/u with Dr Jossy Santana?)   4. She is asking if Dr Jossy Santana knows a \"good\" dermatologist in [facility] to see for her rosacea?

## 2023-02-21 NOTE — PROGRESS NOTES
[facility] NECK SURGERY  CONSULT      Deep Tse (:  1950) is a 67 y.o. female, here for evaluation of the following chief complaint(s):  Otalgia (Bilateral ), Epistaxis, and Pharyngitis      ASSESSMENT/PLAN:  1. Right ear pain  2. History of otitis externa  3. Sensation of fullness in both ears  4. Dysphagia, unspecified type  5. Laryngopharyngeal reflux (LPR)  6. Nasal congestion  7. Nasal obstruction  8. Epistaxis  9. Throat pain  10. Deviated nasal septum      This is a very pleasant 67 y.o. female here today for evaluation of the the above-noted complaints. The patient had an extensive list of complaints involving her ears, nose and throat. A significant amount of time was spent discussing each of the patient's problems as well as potential treatment options.      -For her otologic complaints, no obvious source of infection. Some of this may be related to eustachian tube dysfunction or just generalized inflammation. We will prescribe her Cortisporin drops to the right ear.  -Epistaxis has been pretty minimal.  We discussed management strategies in detail. I recommend that she use nasal saline mist.  We discussed how this time of the year can exacerbate her symptoms. I do not feel she warrants intervention at this time.  -No evidence of lesions of the throat. The patient has been seen by several ENTs in the past for similar complaints and scoped with no evidence of underlying lesions. If her symptoms get worse or persist, we could proceed with a CT scan of the neck. I recommend that we proceed cautiously at this time.  -Prior history of thyroidectomy for unclear reasons. Recommend that she continue her Synthroid which is managed by another provider. Medical Decision Making:   The following items were considered in medical decision making:  Independent review of images  Review / order clinical lab tests  Review / order radiology tests  Decision to obtain old records  Review and summation of old records as accessed through [facility] if applicable    SUBJECTIVE/OBJECTIVE:  HPI    Maria Elena Francis is here today for evaluation of multiple complaints. The patient has issues related to her ears including ear fullness, ear pressure, muffled hearing. She gets a lot of tenderness in her right ear. When she puts her finger near that area will be tender. She will occasionally get tenderness with chewing but not too much. She has not had a recent hearing test.    She was seen at [facility] when she was visiting family members 2 months ago. She was told she had a ruptured right eardrum and a fungal infection. She has a previous diagnosis of TMJ based on an MRI. Patient also has issues related to epistaxis. This is been over the last several months. Is been relatively mild. She is always able to get them stopped on her own. She has not required packing or surgery to address this. No history of bleeding disorder. No history of frequent sinus infections or surgery in the nose. She is not using any medicines in her nose at present. Patient has a history of thyroidectomy. She is unclear what the reason was, may have been cancer or may have just been benign nodules. She currently takes Synthroid for this. She has had some issues getting proper dosage adjustment of this in the past.    Patient has an issue of feeling like something will occasionally get stuck in the throat. She has not had a recent swallow test.  She had someone scoped her reportedly several months ago when she was told that it was normal.  She is concerned about a possible mass in her neck causing her symptoms. No significant voice changes, shortness of breath, hemoptysis, intermittent odynophagia that is not intractable.     REVIEW OF SYSTEMS  The following systems were reviewed and revealed the following in addition to any already discussed in the HPI:    PHYSICAL EXAM    GENERAL: No acute distress, alert and oriented, no hoarseness, strong voice  EYES: EOMI, Anti-icteric  HENT:   Head: Normocephalic and atraumatic. Face:  Symmetric, facial nerve intact  Right Ear: Normal external ear, normal external auditory canal, intact tympanic membrane with normal mobility and aerated middle ear  Left Ear: Normal external ear, normal external auditory canal, intact tympanic membrane with normal mobility and aerated middle ear  Mouth/Oral Cavity:  normal lips, Uvula is midline, no mucosal lesions, no trismus, fair dentition, normal salivary quality/flow  Oropharynx/Larynx:  normal oropharynx, unremarkable tonsils  Nose:Normal external nasal appearance. Anterior rhinoscopy shows  a deviated septum preventing view posteriorly. Dried blood on the head of the turbinates. Normal mucosa   NECK: Normal range of motion, no thyromegaly, trachea is midline, no lymphadenopathy, no neck masses, no crepitus  CHEST: Normal respiratory effort, no retractions, breathing comfortably  SKIN: No rashes, normal appearing skin, no evidence of skin lesions/tumors  Neuro:  cranial nerve II-XII intact; normal gait  Cardio:  no edema    Significant reported tenderness upon examination of the right ear. No obvious lesions. PROCEDURE  Nasal Endoscopy (CPT code 45018)       Due to the patients chronic sinus disease and/or history of sinonasal neoplasm for surveillance a nasal endoscopy with or without debridement will be performed to complete a significant physical examination of the patient which cannot be performed by anterior rhinoscopy alone (failure of complete examination of the paranasal sinuses). Failure to provide this procedure may lead to late detection of significant chronic benign disease, acute exacerbation, resolution or failure of early diagnosis of recurrent cancer. The procedure report is present in the body of the chart. Verbal consent was received.  After topical anesthesia and decongestion had been obtained using aerosolized 1% lidocaine and oxymetazoline, a 45 degree rigid endoscope was placed into both nares with the patient in a sitting position. The following was observed:      Septum: intact and deviated   Other:   -The inferior and middle turbinates were examined. The middle meatus, and sphenoethmoid recess was examined bilaterally. -Dried blood along the septum. Upper aerodigestive tract visualized, true vocal cord motion was symmetric, mild interarytenoid edema.   -There were no complications. Tolerated well without complication. I attest that I was present for and did the entire procedure myself. This note was generated completely or in part utilizing Dragon dictation speech recognition software. Occasionally, words are mistranscribed and despite editing, the text may contain inaccuracies due to incorrect word recognition. If further clarification is needed please contact the office at [phone]. An electronic signature was used to authenticate this note.     --Surekha Landeros MD

## 2023-04-17 ENCOUNTER — TELEMEDICINE (OUTPATIENT)
Dept: PSYCHIATRY | Facility: CLINIC | Age: 73
End: 2023-04-17
Payer: MEDICARE

## 2023-04-17 DIAGNOSIS — F41.9 ANXIETY: Chronic | ICD-10-CM

## 2023-04-17 DIAGNOSIS — F33.1 MODERATE EPISODE OF RECURRENT MAJOR DEPRESSIVE DISORDER: Primary | Chronic | ICD-10-CM
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
## 2023-08-09 PROBLEM — G60.9 IDIOPATHIC NEUROPATHY: Status: RESOLVED | Noted: 2021-01-28 | Resolved: 2023-08-09

## 2023-08-11 DIAGNOSIS — E06.3 HYPOTHYROIDISM DUE TO HASHIMOTO'S THYROIDITIS: ICD-10-CM

## 2023-08-11 DIAGNOSIS — E03.8 HYPOTHYROIDISM DUE TO HASHIMOTO'S THYROIDITIS: ICD-10-CM

## 2023-08-11 LAB — T4 FREE SERPL-MCNC: 2.5 NG/DL (ref 0.9–1.8)

## 2023-08-28 ENCOUNTER — TELEPHONE (OUTPATIENT)
Dept: CARDIOLOGY CLINIC | Age: 73
End: 2023-08-28

## 2023-08-28 NOTE — TELEPHONE ENCOUNTER
Patient called over to cath lab regarding Dr. Kelvin Jacobs appointment. Spoke with patient for clarification. She has upcoming appointment to establish care. Provided phone number for old records.  [phone]    Patient scheduled 10/3/23

## 2023-09-22 ENCOUNTER — TELEPHONE (OUTPATIENT)
Dept: PULMONOLOGY | Age: 73
End: 2023-09-22

## 2023-09-22 NOTE — TELEPHONE ENCOUNTER
This patient had an appt scheduled Monday. I cancelled it as she was dismissed by Dr Richar Munguia 2/96/23. I called her to let her know, but her voicemail is full so I was unable to leave her a message. I am unable to send [place] messages. Please send a [employer] message to Katelyn Sunday reminding her that she was dismissed 2/6 and her appointment Monday has been cancelled. Thank you! I will also email her.

## 2024-01-11 ENCOUNTER — TELEPHONE (OUTPATIENT)
Dept: PULMONOLOGY | Age: 74
End: 2024-01-11

## 2024-01-11 NOTE — TELEPHONE ENCOUNTER
Pt called in stating that she needed to cancel her appt for today. Cancelled appt and gave pt options. Pt looked at calendar for extended period of time >3min. Asked pt if she would like to look at her schedule and call back and pt stated that she did not want to do that. Pt looked at her schedule ay length >3min and I asked pt if she could give us a call back when she has the opportunity to look at her schedule. Pt refused. Asked pt if I could schedule soonest appt and she can call back if that does not work for her. Pt agreed to plan.

## 2024-04-03 LAB — MAMMOGRAPHY, EXTERNAL: NORMAL

## (undated) DEVICE — SNARES COLD OVAL 10MM THIN

## (undated) DEVICE — TRAP POLYP ETRAP

## (undated) DEVICE — FORMALIN CLEAR VIAL 20 ML 10%

## (undated) DEVICE — FORCEPS BX 240CM 2.4MM L NDL RAD JAW 4 M00513334

## (undated) DEVICE — ENDOSCOPY KIT: Brand: MEDLINE INDUSTRIES, INC.

## (undated) DEVICE — BITE BLOCK ENDOSCP AD 60 FR W/ ADJ STRP PLAS GRN BLOX